# Patient Record
Sex: FEMALE | Race: WHITE | NOT HISPANIC OR LATINO | Employment: OTHER | URBAN - METROPOLITAN AREA
[De-identification: names, ages, dates, MRNs, and addresses within clinical notes are randomized per-mention and may not be internally consistent; named-entity substitution may affect disease eponyms.]

---

## 2017-05-03 ENCOUNTER — TELEPHONE (OUTPATIENT)
Dept: URGENT CARE | Facility: CLINIC | Age: 57
End: 2017-05-03

## 2017-05-03 ENCOUNTER — HOSPITAL ENCOUNTER (OUTPATIENT)
Dept: RADIOLOGY | Facility: MEDICAL CENTER | Age: 57
End: 2017-05-03
Attending: PHYSICIAN ASSISTANT
Payer: COMMERCIAL

## 2017-05-03 ENCOUNTER — OFFICE VISIT (OUTPATIENT)
Dept: URGENT CARE | Facility: CLINIC | Age: 57
End: 2017-05-03
Payer: COMMERCIAL

## 2017-05-03 VITALS
DIASTOLIC BLOOD PRESSURE: 78 MMHG | TEMPERATURE: 99.3 F | HEART RATE: 103 BPM | OXYGEN SATURATION: 95 % | HEIGHT: 66 IN | RESPIRATION RATE: 14 BRPM | BODY MASS INDEX: 47.09 KG/M2 | SYSTOLIC BLOOD PRESSURE: 132 MMHG | WEIGHT: 293 LBS

## 2017-05-03 DIAGNOSIS — R22.9 SOFT TISSUE SWELLING: ICD-10-CM

## 2017-05-03 DIAGNOSIS — R22.42 MASS OF LEFT ANKLE: ICD-10-CM

## 2017-05-03 PROCEDURE — 93971 EXTREMITY STUDY: CPT | Mod: LT

## 2017-05-03 PROCEDURE — 99214 OFFICE O/P EST MOD 30 MIN: CPT | Performed by: PHYSICIAN ASSISTANT

## 2017-05-03 PROCEDURE — 76882 US LMTD JT/FCL EVL NVASC XTR: CPT | Mod: LT

## 2017-05-03 RX ORDER — ASPIRIN 81 MG/1
81 TABLET, CHEWABLE ORAL DAILY
COMMUNITY
End: 2023-02-02 | Stop reason: SDUPTHER

## 2017-05-03 RX ORDER — LISINOPRIL 40 MG/1
TABLET ORAL
Refills: 11 | COMMUNITY
Start: 2017-04-25 | End: 2024-02-29

## 2017-05-03 RX ORDER — METOPROLOL SUCCINATE 25 MG/1
TABLET, EXTENDED RELEASE ORAL
Refills: 11 | COMMUNITY
Start: 2017-04-25 | End: 2022-02-22

## 2017-05-03 RX ORDER — BUPROPION HYDROCHLORIDE 150 MG/1
TABLET, EXTENDED RELEASE ORAL
Refills: 5 | COMMUNITY
Start: 2017-04-25 | End: 2022-02-22

## 2017-05-03 ASSESSMENT — ENCOUNTER SYMPTOMS
PSYCHIATRIC NEGATIVE: 1
MYALGIAS: 1
GASTROINTESTINAL NEGATIVE: 1
CONSTITUTIONAL NEGATIVE: 1
FALLS: 0
CARDIOVASCULAR NEGATIVE: 1
EYES NEGATIVE: 1
SENSORY CHANGE: 0
RESPIRATORY NEGATIVE: 1

## 2017-05-03 NOTE — PATIENT INSTRUCTIONS
I recommend compression stockings and leg elevation.  Get US done and I will call you with results.    Ganglion Cyst  A ganglion cyst is a noncancerous, fluid-filled lump that occurs near joints or tendons. The ganglion cyst grows out of a joint or the lining of a tendon. It most often develops in the hand or wrist, but it can also develop in the shoulder, elbow, hip, knee, ankle, or foot. The round or oval ganglion cyst can be the size of a pea or larger than a grape. Increased activity may enlarge the size of the cyst because more fluid starts to build up.   CAUSES  It is not known what causes a ganglion cyst to grow. However, it may be related to:  · Inflammation or irritation around the joint.  · An injury.  · Repetitive movements or overuse.  · Arthritis.  RISK FACTORS  Risk factors include:  · Being a woman.  · Being age 20-50.  SIGNS AND SYMPTOMS  Symptoms may include:   · A lump. This most often appears on the hand or wrist, but it can occur in other areas of the body.  · Tingling.  · Pain.  · Numbness.  · Muscle weakness.  · Weak .  · Less movement in a joint.  DIAGNOSIS  Ganglion cysts are most often diagnosed based on a physical exam. Your health care provider will feel the lump and may shine a light alongside it. If it is a ganglion cyst, a light often shines through it. Your health care provider may order an X-ray, ultrasound, or MRI to rule out other conditions.  TREATMENT  Ganglion cysts usually go away on their own without treatment. If pain or other symptoms are involved, treatment may be needed. Treatment is also needed if the ganglion cyst limits your movement or if it gets infected. Treatment may include:  · Wearing a brace or splint on your wrist or finger.  · Taking anti-inflammatory medicine.  · Draining fluid from the lump with a needle (aspiration).  · Injecting a steroid into the joint.  · Surgery to remove the ganglion cyst.  HOME CARE INSTRUCTIONS  · Do not press on the ganglion cyst,  poke it with a needle, or hit it.  · Take medicines only as directed by your health care provider.  · Wear your brace or splint as directed by your health care provider.  · Watch your ganglion cyst for any changes.  · Keep all follow-up visits as directed by your health care provider. This is important.  SEEK MEDICAL CARE IF:  · Your ganglion cyst becomes larger or more painful.  · You have increased redness, red streaks, or swelling.  · You have pus coming from the lump.  · You have weakness or numbness in the affected area.  · You have a fever or chills.     This information is not intended to replace advice given to you by your health care provider. Make sure you discuss any questions you have with your health care provider.     Document Released: 12/15/2001 Document Revised: 01/08/2016 Document Reviewed: 06/02/2015  Elsevier Interactive Patient Education ©2016 [employer] Inc.

## 2017-05-03 NOTE — PROGRESS NOTES
Subjective:      Domitila Hua is a 57 y.o. female who presents with Ankle Swelling            HPI  Chief Complaint   Patient presents with   • Ankle Swelling     (L) ankle since last night pt noticed a golf size ball in (L) ankle and thsi morning was red       HPI:  Domitila Hua is a 57 y.o. female who presents with left ankle swelling since last night.  She describes it as a golf ball.  Now red skin changes since this am.  No history of gout. He has not been traveling or immobile for the last several days. No central lump in the shower yesterday. No pain but did notice redness this morning. Thought she had a spider bite. She does normally have lower extremity swelling. No history of pulmonary Embolism or DVTs. She is not on estrogen replacement placement therapy. Non-smoker.  No long involvement at this time. Patient denies HA, SOB, chest pain, palpitations, fever, chills, or n/v/d.    Takes  daily.    No past medical history on file.    Past Surgical History   Procedure Laterality Date   • Gastroscopy with biopsy  4/16/2010     Performed by LATHA PERALTA at ENDOSCOPY [facility]       No family history on file.    Social History     Social History   • Marital Status: Single     Spouse Name: N/A   • Number of Children: N/A   • Years of Education: N/A     Occupational History   • Not on file.     Social History Main Topics   • Smoking status: Never Smoker    • Smokeless tobacco: Never Used   • Alcohol Use: No   • Drug Use: No   • Sexual Activity: Not on file     Other Topics Concern   • Not on file     Social History Narrative         Current outpatient prescriptions:   •  aspirin, 81 mg, Oral, DAILY  •  buPROPion SR, TK 1 T PO  BID  •  lisinopril, TK 1 T PO QD  •  metoprolol SR, TK 1 T PO  QD  •  erythromycin, Apply 1/2 cm ribbon to upper inner eyelid QID x 5 days  •  oxycodone-acetaminophen, 1-2 Tab, Oral, Q4HRS PRN  •  buPROPion, 75 mg, Oral, BID  •  metoprolol, 25 mg, Oral, DAILY  •   "aspirin, 325 mg, Oral, DAILY  •  lisinopril, Take  by mouth every day.  •  BUPROPION HCL, 150 mg, Oral, BID    Allergies   Allergen Reactions   • Nkda [No Known Drug Allergy]           Review of Systems   Constitutional: Negative.    HENT: Negative.    Eyes: Negative.    Respiratory: Negative.    Cardiovascular: Negative.  Negative for leg swelling.   Gastrointestinal: Negative.    Genitourinary: Negative.    Musculoskeletal: Positive for myalgias and joint pain. Negative for falls.   Skin:        Redness to ankle   Neurological: Negative for sensory change.   Psychiatric/Behavioral: Negative.           Objective:     /78 mmHg  Pulse 103  Temp(Src) 37.4 °C (99.3 °F)  Resp 14  Ht 1.676 m (5' 6\")  Wt 156.945 kg (346 lb)  BMI 55.87 kg/m2  SpO2 95%     Physical Exam   Musculoskeletal:        Feet:           Nursing note and vitals reviewed.    Constitutional:  Appropriately groomed, pleasant affect, well nourished, and in no acute distress.    HEENT:  Head: Atraumatic, normocephalic.    Ears:  Hearing grossly intact to voice.    Eyes:  Conjunctivae clear, sclera white, and medial canthus without exudate bilaterally.    Lungs:  Lungs with normal respiratory excursion and effort.      Left Ankle:  Moderate edema bilaterally, left medial ankle greater than right.  No erythema, or ecchymosis present.  Palpable firm ballotable cystlike structure present over the medial malleolus with no tenderness to palpation. No Ttp across the tarsals.  No ttp over ATFL, deltoid ligaments, med/lat malleoulus, achilles tendon, or metatarsals.      FROM: Dorsiflexion, plantar flexion, inversion, eversion.      Motor Examination: Dorsi/plantar flexion 5/5 without atrophy.  Negative Chester's.      Negative Anterior/Posterior drawer.     Sensation equal bilaterally to light touch for L4, L5, and S1.      DTR 2+ for Achilles and patella bilaterally.  NVS intact, DP 2+ bilaterally.  Gait and station wnl, non antalgic.    Derm:  No " rashes or lesions with good turgor pressure.     Psychiatric:  Normal judgement, mood and affect.    Assessment/Plan:     1. Soft tissue swelling  Patient presents with soft tissue swelling over the left medial malleolus since yesterday. No pain associated. Patient has no history of DVTs or pulmonary embolisms. She is not on estrogen replacement therapy. She states that she was once tested for DVT with ultrasound of the left leg years passage was negative. On exam patient does have a golf ball sized firm nodule/cystlike structure just superior to left medial malleolus but no superficial skin changes. Some vascularity noted. No pain with ankle range of motion. No pain along the greater saphenous vein. Plan order ultrasound to rule out DVT or early thrombosis. Again patient has a low well's criteria. Diagnosis includes ganglion on cyst. Epidermoid cyst, and thrombosis. May consider referral to orthopedics if ultrasound is definitive for ganglion cyst. At this time recommended compression and elevation. ER precautions given.    Patient was in agreement with this treatment plan and seemed to understand without barriers. All questions were encouraged and answered.  Reviewed signs and symptoms of when to seek emergency medical care.     Please note that this dictation was created using voice recognition software.  I have made every reasonable attempt to correct obvious errors, but I expect there are errors of jared and possibly content that I did not discover before finalizing the note.     - US-EXTREMITY VENOUS UNILATERAL-LOWER LEFT; Future

## 2017-05-03 NOTE — MR AVS SNAPSHOT
"        Domitila Hua   5/3/2017 12:00 PM   Office Visit   MRN: [id number]    Department:  [facility]   Dept Phone:  [phone]    Description:  Female : 1960   Provider:  Sin Toro PA-C           Reason for Visit     Ankle Swelling (L) ankle since last night pt noticed a golf size ball in (L) ankle and thsi morning was red      Allergies as of 5/3/2017     Allergen Noted Reactions    Nkda [No Known Drug Allergy] 2010         You were diagnosed with     Soft tissue swelling   [[id number]]         Vital Signs     Blood Pressure Pulse Temperature Respirations Height Weight    132/78 mmHg 103 37.4 °C (99.3 °F) 14 1.676 m (5' 6\") 156.945 kg (346 lb)    Body Mass Index Oxygen Saturation Smoking Status             55.87 kg/m2 95% Never Smoker          Basic Information     Date Of Birth Sex Race Ethnicity Preferred Language    1960 Female White Non- English      Your appointments     May 03, 2017  5:30 PM   US EXTREMITY (30) A with VISTA US 1   [facility] ([place]    [address] [facility] [address]   [phone]            May 03, 2017  6:00 PM   US EXTREMITY (30) with VISTA US 1   IMAGING VISTA ([place])    [address] [place] [address]   [phone]              Health Maintenance        Date Due Completion Dates    IMM DTaP/Tdap/Td Vaccine (1 - Tdap) 1981 ---    PAP SMEAR 1983 ---    MAMMOGRAM 2002 ---    COLONOSCOPY 2012 ---            Current Immunizations     No immunizations on file.      Below and/or attached are the medications your provider expects you to take. Review all of your home medications and newly ordered medications with your provider and/or pharmacist. Follow medication instructions as directed by your provider and/or pharmacist. Please keep your medication list with you and share with your provider. Update the information when medications are discontinued, doses are changed, or new medications (including over-the-counter " products) are added; and carry medication information at all times in the event of emergency situations     Allergies:  NKDA - (reactions not documented)               Medications  Valid as of: May 03, 2017 - 12:59 PM    Generic Name Brand Name Tablet Size Instructions for use    Aspirin (Tab)  MG Take 325 mg by mouth every day.        Aspirin (Chew Tab) ASA 81 MG Take 81 mg by mouth every day.        BuPROPion HCl   Take 150 mg by mouth 2 Times a Day.        BuPROPion HCl (Tab) WELLBUTRIN 75 MG Take 75 mg by mouth 2 times a day.        BuPROPion HCl (TABLET SR 12 HR) WELLBUTRIN- MG TK 1 T PO  BID        Erythromycin (Ointment) erythromycin 5 MG/GM Apply 1/2 cm ribbon to upper inner eyelid QID x 5 days        Lisinopril (Tab) PRINIVIL 20 MG Take  by mouth every day.        Lisinopril (Tab) PRINIVIL, ZESTRIL 40 MG TK 1 T PO QD        Metoprolol Succinate (TABLET SR 24 HR) TOPROL XL 25 MG TK 1 T PO  QD        Metoprolol Tartrate (Tab) LOPRESSOR 25 MG Take 25 mg by mouth every day.        Oxycodone-Acetaminophen (Tab) PERCOCET 5-325 MG Take 1-2 Tabs by mouth every four hours as needed.        .                 Medicines prescribed today were sent to:     [facility] [address] [place] [address] AT [facility] & [address] [address]    Phone: [phone] Fax: [phone]    Open 24 Hours?: No      Medication refill instructions:       If your prescription bottle indicates you have medication refills left, it is not necessary to call your provider’s office. Please contact your pharmacy and they will refill your medication.    If your prescription bottle indicates you do not have any refills left, you may request refills at any time through one of the following ways: The online [employer] system (except Urgent Care), by calling your provider’s office, or by asking your pharmacy to contact your provider’s office with a refill request. Medication refills are  processed only during regular business hours and may not be available until the next business day. Your provider may request additional information or to have a follow-up visit with you prior to refilling your medication.   *Please Note: Medication refills are assigned a new Rx number when refilled electronically. Your pharmacy may indicate that no refills were authorized even though a new prescription for the same medication is available at the pharmacy. Please request the medicine by name with the pharmacy before contacting your provider for a refill.        Your To Do List     Future Labs/Procedures Complete By Expires    US-EXTREMITY VENOUS UNILATERAL-LOWER LEFT  As directed 5/3/2018      Instructions    I recommend compression stockings and leg elevation.  Get US done and I will call you with results.    Ganglion Cyst  A ganglion cyst is a noncancerous, fluid-filled lump that occurs near joints or tendons. The ganglion cyst grows out of a joint or the lining of a tendon. It most often develops in the hand or wrist, but it can also develop in the shoulder, elbow, hip, knee, ankle, or foot. The round or oval ganglion cyst can be the size of a pea or larger than a grape. Increased activity may enlarge the size of the cyst because more fluid starts to build up.   CAUSES  It is not known what causes a ganglion cyst to grow. However, it may be related to:  · Inflammation or irritation around the joint.  · An injury.  · Repetitive movements or overuse.  · Arthritis.  RISK FACTORS  Risk factors include:  · Being a woman.  · Being age 20-50.  SIGNS AND SYMPTOMS  Symptoms may include:   · A lump. This most often appears on the hand or wrist, but it can occur in other areas of the body.  · Tingling.  · Pain.  · Numbness.  · Muscle weakness.  · Weak .  · Less movement in a joint.  DIAGNOSIS  Ganglion cysts are most often diagnosed based on a physical exam. Your health care provider will feel the lump and may shine a  light alongside it. If it is a ganglion cyst, a light often shines through it. Your health care provider may order an X-ray, ultrasound, or MRI to rule out other conditions.  TREATMENT  Ganglion cysts usually go away on their own without treatment. If pain or other symptoms are involved, treatment may be needed. Treatment is also needed if the ganglion cyst limits your movement or if it gets infected. Treatment may include:  · Wearing a brace or splint on your wrist or finger.  · Taking anti-inflammatory medicine.  · Draining fluid from the lump with a needle (aspiration).  · Injecting a steroid into the joint.  · Surgery to remove the ganglion cyst.  HOME CARE INSTRUCTIONS  · Do not press on the ganglion cyst, poke it with a needle, or hit it.  · Take medicines only as directed by your health care provider.  · Wear your brace or splint as directed by your health care provider.  · Watch your ganglion cyst for any changes.  · Keep all follow-up visits as directed by your health care provider. This is important.  SEEK MEDICAL CARE IF:  · Your ganglion cyst becomes larger or more painful.  · You have increased redness, red streaks, or swelling.  · You have pus coming from the lump.  · You have weakness or numbness in the affected area.  · You have a fever or chills.     This information is not intended to replace advice given to you by your health care provider. Make sure you discuss any questions you have with your health care provider.     Document Released: 12/15/2001 Document Revised: 01/08/2016 Document Reviewed: 06/02/2015  [employer] Interactive Patient Education ©2016 [employer]            [employer] Access Code: [email or web address]  Expires: 6/2/2017 12:59 PM    [employer]  A secure, online tool to manage your health information     [employer]® is a secure, online tool that connects you to your personalized health information from the privacy of your home -- day or night - making it very easy for you to  manage your healthcare. Once the activation process is completed, you can even access your medical information using the "[employer] marcial, which is available for free in the [employer] Marcial store or Google Play store.     "[employer] provides the following levels of access (as shown below):   My Chart Features   [facility] Doctor [facility]  Specialists [facility]  Urgent  Care Non-[facility]  Primary Care  Doctor   Email your healthcare team securely and privately 24/7 X X X    Manage appointments: schedule your next appointment; view details of past/upcoming appointments X      Request prescription refills. X      View recent personal medical records, including lab and immunizations X X X X   View health record, including health history, allergies, medications X X X X   Read reports about your outpatient visits, procedures, consult and ER notes X X X X   See your discharge summary, which is a recap of your hospital and/or ER visit that includes your diagnosis, lab results, and care plan. X X       How to register for "[employer]:  1. Go to  [email or web address].  2. Click on the Sign Up Now box, which takes you to the New Member Sign Up page. You will need to provide the following information:  a. Enter your "[employer] Access Code exactly as it appears at the top of this page. (You will not need to use this code after you’ve completed the sign-up process. If you do not sign up before the expiration date, you must request a new code.)   b. Enter your date of birth.   c. Enter your home email address.   d. Click Submit, and follow the next screen’s instructions.  3. Create a "[employer] ID. This will be your "[employer] login ID and cannot be changed, so think of one that is secure and easy to remember.  4. Create a "[employer] password. You can change your password at any time.  5. Enter your Password Reset Question and Answer. This can be used at a later time if you forget your password.   6. Enter your e-mail address. This allows you to  receive e-mail notifications when new information is available in [employer].  7. Click Sign Up. You can now view your health information.    For assistance activating your [employer] account, call [phone]

## 2017-05-04 NOTE — TELEPHONE ENCOUNTER
Informed patient of ultrasound results. Informed her that there is no evidence of DVT and likely not a ganglion cyst. Would require MRI for further evaluation. Most likely fat lipoma but no sign of infection. Reviewed signs and symptoms of infection when to return to clinic. Recommended that she follow with her PCP for further evaluation. At this time elevate and compression and monitor. Patient was in agreement with this treatment plan. She was appreciative of the phone call. All questions were encouraged and answered.

## 2017-07-11 ENCOUNTER — HOSPITAL ENCOUNTER (EMERGENCY)
Facility: MEDICAL CENTER | Age: 57
End: 2017-07-11
Attending: EMERGENCY MEDICINE
Payer: COMMERCIAL

## 2017-07-11 ENCOUNTER — APPOINTMENT (OUTPATIENT)
Dept: RADIOLOGY | Facility: MEDICAL CENTER | Age: 57
End: 2017-07-11
Attending: EMERGENCY MEDICINE
Payer: COMMERCIAL

## 2017-07-11 VITALS
TEMPERATURE: 97.1 F | HEIGHT: 66 IN | HEART RATE: 89 BPM | DIASTOLIC BLOOD PRESSURE: 92 MMHG | WEIGHT: 293 LBS | SYSTOLIC BLOOD PRESSURE: 191 MMHG | RESPIRATION RATE: 20 BRPM | BODY MASS INDEX: 47.09 KG/M2 | OXYGEN SATURATION: 94 %

## 2017-07-11 DIAGNOSIS — N20.1 URETEROLITHIASIS: ICD-10-CM

## 2017-07-11 DIAGNOSIS — R10.9 FLANK PAIN: ICD-10-CM

## 2017-07-11 LAB
ALBUMIN SERPL BCP-MCNC: 3.8 G/DL (ref 3.2–4.9)
ALBUMIN/GLOB SERPL: 1 G/DL
ALP SERPL-CCNC: 80 U/L (ref 30–99)
ALT SERPL-CCNC: 9 U/L (ref 2–50)
ANION GAP SERPL CALC-SCNC: 11 MMOL/L (ref 0–11.9)
ANISOCYTOSIS BLD QL SMEAR: ABNORMAL
APPEARANCE UR: ABNORMAL
AST SERPL-CCNC: 12 U/L (ref 12–45)
BASOPHILS # BLD AUTO: 0.5 % (ref 0–1.8)
BASOPHILS # BLD: 0.04 K/UL (ref 0–0.12)
BILIRUB SERPL-MCNC: 0.4 MG/DL (ref 0.1–1.5)
BUN SERPL-MCNC: 17 MG/DL (ref 8–22)
CALCIUM SERPL-MCNC: 9.1 MG/DL (ref 8.5–10.5)
CHLORIDE SERPL-SCNC: 105 MMOL/L (ref 96–112)
CO2 SERPL-SCNC: 22 MMOL/L (ref 20–33)
COLOR UR AUTO: ABNORMAL
COMMENT 1642: NORMAL
CREAT SERPL-MCNC: 1.09 MG/DL (ref 0.5–1.4)
EOSINOPHIL # BLD AUTO: 0.19 K/UL (ref 0–0.51)
EOSINOPHIL NFR BLD: 2.6 % (ref 0–6.9)
ERYTHROCYTE [DISTWIDTH] IN BLOOD BY AUTOMATED COUNT: 48.9 FL (ref 35.9–50)
GFR SERPL CREATININE-BSD FRML MDRD: 52 ML/MIN/1.73 M 2
GLOBULIN SER CALC-MCNC: 3.7 G/DL (ref 1.9–3.5)
GLUCOSE SERPL-MCNC: 152 MG/DL (ref 65–99)
GLUCOSE UR QL STRIP.AUTO: NEGATIVE MG/DL
HCT VFR BLD AUTO: 38.3 % (ref 37–47)
HGB BLD-MCNC: 11.3 G/DL (ref 12–16)
HYPOCHROMIA BLD QL SMEAR: ABNORMAL
IMM GRANULOCYTES # BLD AUTO: 0.03 K/UL (ref 0–0.11)
IMM GRANULOCYTES NFR BLD AUTO: 0.4 % (ref 0–0.9)
KETONES UR QL STRIP.AUTO: NEGATIVE MG/DL
LEUKOCYTE ESTERASE UR QL STRIP.AUTO: NEGATIVE
LIPASE SERPL-CCNC: 21 U/L (ref 11–82)
LYMPHOCYTES # BLD AUTO: 1 K/UL (ref 1–4.8)
LYMPHOCYTES NFR BLD: 13.7 % (ref 22–41)
MCH RBC QN AUTO: 23.2 PG (ref 27–33)
MCHC RBC AUTO-ENTMCNC: 29.5 G/DL (ref 33.6–35)
MCV RBC AUTO: 78.5 FL (ref 81.4–97.8)
MICROCYTES BLD QL SMEAR: ABNORMAL
MONOCYTES # BLD AUTO: 0.46 K/UL (ref 0–0.85)
MONOCYTES NFR BLD AUTO: 6.3 % (ref 0–13.4)
MORPHOLOGY BLD-IMP: NORMAL
NEUTROPHILS # BLD AUTO: 5.58 K/UL (ref 2–7.15)
NEUTROPHILS NFR BLD: 76.5 % (ref 44–72)
NITRITE UR QL STRIP.AUTO: NEGATIVE
NRBC # BLD AUTO: 0 K/UL
NRBC BLD AUTO-RTO: 0 /100 WBC
OVALOCYTES BLD QL SMEAR: NORMAL
PH UR STRIP.AUTO: 6 [PH]
PLATELET # BLD AUTO: 232 K/UL (ref 164–446)
PLATELET BLD QL SMEAR: NORMAL
PMV BLD AUTO: 9.7 FL (ref 9–12.9)
POIKILOCYTOSIS BLD QL SMEAR: NORMAL
POTASSIUM SERPL-SCNC: 4.2 MMOL/L (ref 3.6–5.5)
PROT SERPL-MCNC: 7.5 G/DL (ref 6–8.2)
PROT UR QL STRIP: 100 MG/DL
RBC # BLD AUTO: 4.88 M/UL (ref 4.2–5.4)
RBC BLD AUTO: PRESENT
RBC UR QL AUTO: ABNORMAL
SODIUM SERPL-SCNC: 138 MMOL/L (ref 135–145)
SP GR UR: >=1.03
WBC # BLD AUTO: 7.3 K/UL (ref 4.8–10.8)

## 2017-07-11 PROCEDURE — 96374 THER/PROPH/DIAG INJ IV PUSH: CPT

## 2017-07-11 PROCEDURE — 700111 HCHG RX REV CODE 636 W/ 250 OVERRIDE (IP): Performed by: EMERGENCY MEDICINE

## 2017-07-11 PROCEDURE — 83690 ASSAY OF LIPASE: CPT

## 2017-07-11 PROCEDURE — 36415 COLL VENOUS BLD VENIPUNCTURE: CPT

## 2017-07-11 PROCEDURE — 302128 INFUSION PUMP: Performed by: EMERGENCY MEDICINE

## 2017-07-11 PROCEDURE — 99284 EMERGENCY DEPT VISIT MOD MDM: CPT

## 2017-07-11 PROCEDURE — 96375 TX/PRO/DX INJ NEW DRUG ADDON: CPT

## 2017-07-11 PROCEDURE — 74176 CT ABD & PELVIS W/O CONTRAST: CPT

## 2017-07-11 PROCEDURE — 80053 COMPREHEN METABOLIC PANEL: CPT

## 2017-07-11 PROCEDURE — 81002 URINALYSIS NONAUTO W/O SCOPE: CPT

## 2017-07-11 PROCEDURE — 85025 COMPLETE CBC W/AUTO DIFF WBC: CPT

## 2017-07-11 PROCEDURE — 700105 HCHG RX REV CODE 258: Performed by: EMERGENCY MEDICINE

## 2017-07-11 RX ORDER — ONDANSETRON 2 MG/ML
4 INJECTION INTRAMUSCULAR; INTRAVENOUS ONCE
Status: COMPLETED | OUTPATIENT
Start: 2017-07-11 | End: 2017-07-11

## 2017-07-11 RX ORDER — SODIUM CHLORIDE 9 MG/ML
INJECTION, SOLUTION INTRAVENOUS CONTINUOUS
Status: DISCONTINUED | OUTPATIENT
Start: 2017-07-11 | End: 2017-07-11 | Stop reason: HOSPADM

## 2017-07-11 RX ORDER — OXYCODONE HYDROCHLORIDE AND ACETAMINOPHEN 5; 325 MG/1; MG/1
1-2 TABLET ORAL EVERY 4 HOURS PRN
Qty: 15 TAB | Refills: 0 | Status: SHIPPED | OUTPATIENT
Start: 2017-07-11 | End: 2023-02-02

## 2017-07-11 RX ORDER — ONDANSETRON 4 MG/1
4 TABLET, ORALLY DISINTEGRATING ORAL EVERY 8 HOURS PRN
Qty: 10 TAB | Refills: 0 | Status: SHIPPED | OUTPATIENT
Start: 2017-07-11 | End: 2023-02-02

## 2017-07-11 RX ORDER — KETOROLAC TROMETHAMINE 30 MG/ML
30 INJECTION, SOLUTION INTRAMUSCULAR; INTRAVENOUS ONCE
Status: COMPLETED | OUTPATIENT
Start: 2017-07-11 | End: 2017-07-11

## 2017-07-11 RX ADMIN — KETOROLAC TROMETHAMINE 30 MG: 30 INJECTION INTRAMUSCULAR; INTRAVENOUS at 09:20

## 2017-07-11 RX ADMIN — SODIUM CHLORIDE: 9 INJECTION, SOLUTION INTRAVENOUS at 09:20

## 2017-07-11 RX ADMIN — ONDANSETRON 4 MG: 2 INJECTION INTRAMUSCULAR; INTRAVENOUS at 09:20

## 2017-07-11 ASSESSMENT — PAIN SCALES - GENERAL
PAINLEVEL_OUTOF10: 5
PAINLEVEL_OUTOF10: 10

## 2017-07-11 ASSESSMENT — LIFESTYLE VARIABLES: DO YOU DRINK ALCOHOL: NO

## 2017-07-11 NOTE — ED NOTES
"Chief Complaint   Patient presents with   • Flank Pain     Pt BIB self to triage for c/o left flank pain, pt reports symptoms to have started around 0730, Pt reports hx of kideny stones, pt denies vomiting/diarrhea, reports of nausea/ feeling \"clammy\" when pain present. pt denies dysuria.     Explained to pt triage process, made pt aware to tell this RN of any changes/concerns, pt verbalized understanding of process and instructions given. Pt to ER lobby.    "

## 2017-07-11 NOTE — ED AVS SNAPSHOT
Home Care Instructions                                                                                                                Domitila Hua   MRN: [id number]    Department:  [facility], Emergency Dept   Date of Visit:  7/11/2017            [facility], Emergency Dept    [address] [place] [id number]    Phone:  [phone]      You were seen by     Ellis Woods M.D.      Your Diagnosis Was     Flank pain     R10.9       These are the medications you received during your hospitalization from 07/11/2017 0823 to 07/11/2017 1113     Date/Time Order Dose Route Action    07/11/2017 0920 NS infusion   Intravenous New Bag    07/11/2017 0920 ketorolac (TORADOL) injection 30 mg 30 mg Intravenous Given    07/11/2017 0920 ondansetron (ZOFRAN) syringe/vial injection 4 mg 4 mg Intravenous Given      Follow-up Information     1. Follow up with Carlos Wiggins M.D..    Specialty:  Family Medicine    Contact information    [address]  [address]  [facility] [address]  [phone]          2. Follow up with [facility], Emergency Dept.    Specialty:  Emergency Medicine    Why:  If symptoms worsen    Contact information    [address] [place] [id number] [phone]      Medication Information     Review all of your home medications and newly ordered medications with your primary doctor and/or pharmacist as soon as possible. Follow medication instructions as directed by your doctor and/or pharmacist.     Please keep your complete medication list with you and share with your physician. Update the information when medications are discontinued, doses are changed, or new medications (including over-the-counter products) are added; and carry medication information at all times in the event of emergency situations.               Medication List      START taking these medications        Instructions    Morning Afternoon Evening Bedtime    ondansetron 4  MG Tbdp   Commonly known as:  ZOFRAN ODT        Take 1 Tab by mouth every 8 hours as needed.   Dose:  4 mg                        oxycodone-acetaminophen 5-325 MG Tabs   Commonly known as:  PERCOCET        Take 1-2 Tabs by mouth every four hours as needed (pain).   Dose:  1-2 Tab                          ASK your doctor about these medications        Instructions    Morning Afternoon Evening Bedtime    aspirin 81 MG Chew chewable tablet   Commonly known as:  ASA        Take 81 mg by mouth every day.   Dose:  81 mg                        * lisinopril 20 MG Tabs   Commonly known as:  PRINIVIL        Take  by mouth every day.                        * lisinopril 40 MG tablet   Commonly known as:  PRINIVIL, ZESTRIL        TK 1 T PO QD                        metoprolol 25 MG Tabs   Commonly known as:  LOPRESSOR        Take 25 mg by mouth every day.   Dose:  25 mg                        metoprolol SR 25 MG Tb24   Commonly known as:  TOPROL XL        TK 1 T PO  QD                        * buPROPion  MG Tb12 sustained-release tablet   Commonly known as:  WELLBUTRIN-SR        TK 1 T PO  BID                        * WELLBUTRIN 75 MG Tabs   Generic drug:  buPROPion        Take 75 mg by mouth 2 times a day.   Dose:  75 mg                        * Notice:  This list has 4 medication(s) that are the same as other medications prescribed for you. Read the directions carefully, and ask your doctor or other care provider to review them with you.         Where to Get Your Medications      You can get these medications from any pharmacy     Bring a paper prescription for each of these medications    - ondansetron 4 MG Tbdp  - oxycodone-acetaminophen 5-325 MG Tabs            Procedures and tests performed during your visit     CBC WITH DIFFERENTIAL    COMP METABOLIC PANEL    CT-RENAL COLIC EVALUATION(A/P W/O)    DIFFERENTIAL COMMENT    ESTIMATED GFR    INFUSION PUMP    LIPASE    MORPHOLOGY    PERIPHERAL SMEAR REVIEW    PLATELET  ESTIMATE    POC UA        Discharge Instructions       Kidney Stones  Kidney stones (urolithiasis) are deposits that form inside your kidneys. The intense pain is caused by the stone moving through the urinary tract. When the stone moves, the ureter goes into spasm around the stone. The stone is usually passed in the urine.   CAUSES   · A disorder that makes certain neck glands produce too much parathyroid hormone (primary hyperparathyroidism).  · A buildup of uric acid crystals, similar to gout in your joints.  · Narrowing (stricture) of the ureter.  · A kidney obstruction present at birth (congenital obstruction).  · Previous surgery on the kidney or ureters.  · Numerous kidney infections.  SYMPTOMS   · Feeling sick to your stomach (nauseous).  · Throwing up (vomiting).  · Blood in the urine (hematuria).  · Pain that usually spreads (radiates) to the groin.  · Frequency or urgency of urination.  DIAGNOSIS   · Taking a history and physical exam.  · Blood or urine tests.  · CT scan.  · Occasionally, an examination of the inside of the urinary bladder (cystoscopy) is performed.  TREATMENT   · Observation.  · Increasing your fluid intake.  · Extracorporeal shock wave lithotripsy--This is a noninvasive procedure that uses shock waves to break up kidney stones.  · Surgery may be needed if you have severe pain or persistent obstruction. There are various surgical procedures. Most of the procedures are performed with the use of small instruments. Only small incisions are needed to accommodate these instruments, so recovery time is minimized.  The size, location, and chemical composition are all important variables that will determine the proper choice of action for you. Talk to your health care provider to better understand your situation so that you will minimize the risk of injury to yourself and your kidney.   HOME CARE INSTRUCTIONS   · Drink enough water and fluids to keep your urine clear or pale yellow. This will  help you to pass the stone or stone fragments.  · Strain all urine through the provided strainer. Keep all particulate matter and stones for your health care provider to see. The stone causing the pain may be as small as a grain of salt. It is very important to use the strainer each and every time you pass your urine. The collection of your stone will allow your health care provider to analyze it and verify that a stone has actually passed. The stone analysis will often identify what you can do to reduce the incidence of recurrences.  · Only take over-the-counter or prescription medicines for pain, discomfort, or fever as directed by your health care provider.  · Make a follow-up appointment with your health care provider as directed.  · Get follow-up X-rays if required. The absence of pain does not always mean that the stone has passed. It may have only stopped moving. If the urine remains completely obstructed, it can cause loss of kidney function or even complete destruction of the kidney. It is your responsibility to make sure X-rays and follow-ups are completed. Ultrasounds of the kidney can show blockages and the status of the kidney. Ultrasounds are not associated with any radiation and can be performed easily in a matter of minutes.  SEEK MEDICAL CARE IF:  · You experience pain that is progressive and unresponsive to any pain medicine you have been prescribed.  SEEK IMMEDIATE MEDICAL CARE IF:   · Pain cannot be controlled with the prescribed medicine.  · You have a fever or shaking chills.  · The severity or intensity of pain increases over 18 hours and is not relieved by pain medicine.  · You develop a new onset of abdominal pain.  · You feel faint or pass out.  · You are unable to urinate.  MAKE SURE YOU:   · Understand these instructions.  · Will watch your condition.  · Will get help right away if you are not doing well or get worse.     This information is not intended to replace advice given to you by  your health care provider. Make sure you discuss any questions you have with your health care provider.     Document Released: 12/18/2006 Document Revised: 01/08/2016 Document Reviewed: 05/21/2014  Elsevier Interactive Patient Education ©2016 Elsevier Inc.            Patient Information     Patient Information    Following emergency treatment: all patient requiring follow-up care must return either to a private physician or a clinic if your condition worsens before you are able to obtain further medical attention, please return to the emergency room.     Billing Information    At [facility], we work to make the billing process streamlined for our patients.  Our Representatives are here to answer any questions you may have regarding your hospital bill.  If you have insurance coverage and have supplied your insurance information to us, we will submit a claim to your insurer on your behalf.  Should you have any questions regarding your bill, we can be reached online or by phone as follows:  Online: You are able pay your bills online or live chat with our representatives about any billing questions you may have. We are here to help Monday - Friday from 8:00am to 7:30pm and 9:00am - 12:00pm on Saturdays.  Please visit [email or web address]  for more information.   Phone:  [phone] or [phone]    Please note that your emergency physician, surgeon, pathologist, radiologist, anesthesiologist, and other specialists are not employed by [facility] and will therefore bill separately for their services.  Please contact them directly for any questions concerning their bills at the numbers below:     Emergency Physician Services:  [phone]  [facility]:  [phone]  [facility]:  [phone]  [facility]:  [phone]    1. Your final bill may vary from the amount quoted upon discharge if all procedures are not complete at that time,  or if your doctor has additional procedures of which we are not aware. You will receive an additional bill if you return to the Emergency Department at [facility] for suture removal regardless of the facility of which the sutures were placed.     2. Please arrange for settlement of this account at the emergency registration.    3. All self-pay accounts are due in full at the time of treatment.  If you are unable to meet this obligation then payment is expected within 4-5 days.     4. If you have had radiology studies (CT, X-ray, Ultrasound, MRI), you have received a preliminary result during your emergency department visit. Please contact the radiology department [phone] to receive a copy of your final result. Please discuss the Final result with your primary physician or with the follow up physician provided.     Crisis Hotline:  [place] Crisis Hotline:  4-596-BACSWST or 1-901.788.3129  [place] Crisis Hotline:    1-695.612.6386 or 197-139-1581         ED Discharge Follow Up Questions    1. In order to provide you with very good care, we would like to follow up with a phone call in the next few days.  May we have your permission to contact you?     YES /  NO    2. What is the best phone number to call you? (       )_____-__________    3. What is the best time to call you?      Morning  /  Afternoon  /  Evening                   Patient Signature:  ____________________________________________________________    Date:  ____________________________________________________________

## 2017-07-11 NOTE — DISCHARGE INSTRUCTIONS
Kidney Stones  Kidney stones (urolithiasis) are deposits that form inside your kidneys. The intense pain is caused by the stone moving through the urinary tract. When the stone moves, the ureter goes into spasm around the stone. The stone is usually passed in the urine.   CAUSES   · A disorder that makes certain neck glands produce too much parathyroid hormone (primary hyperparathyroidism).  · A buildup of uric acid crystals, similar to gout in your joints.  · Narrowing (stricture) of the ureter.  · A kidney obstruction present at birth (congenital obstruction).  · Previous surgery on the kidney or ureters.  · Numerous kidney infections.  SYMPTOMS   · Feeling sick to your stomach (nauseous).  · Throwing up (vomiting).  · Blood in the urine (hematuria).  · Pain that usually spreads (radiates) to the groin.  · Frequency or urgency of urination.  DIAGNOSIS   · Taking a history and physical exam.  · Blood or urine tests.  · CT scan.  · Occasionally, an examination of the inside of the urinary bladder (cystoscopy) is performed.  TREATMENT   · Observation.  · Increasing your fluid intake.  · Extracorporeal shock wave lithotripsy--This is a noninvasive procedure that uses shock waves to break up kidney stones.  · Surgery may be needed if you have severe pain or persistent obstruction. There are various surgical procedures. Most of the procedures are performed with the use of small instruments. Only small incisions are needed to accommodate these instruments, so recovery time is minimized.  The size, location, and chemical composition are all important variables that will determine the proper choice of action for you. Talk to your health care provider to better understand your situation so that you will minimize the risk of injury to yourself and your kidney.   HOME CARE INSTRUCTIONS   · Drink enough water and fluids to keep your urine clear or pale yellow. This will help you to pass the stone or stone fragments.  · Strain  all urine through the provided strainer. Keep all particulate matter and stones for your health care provider to see. The stone causing the pain may be as small as a grain of salt. It is very important to use the strainer each and every time you pass your urine. The collection of your stone will allow your health care provider to analyze it and verify that a stone has actually passed. The stone analysis will often identify what you can do to reduce the incidence of recurrences.  · Only take over-the-counter or prescription medicines for pain, discomfort, or fever as directed by your health care provider.  · Make a follow-up appointment with your health care provider as directed.  · Get follow-up X-rays if required. The absence of pain does not always mean that the stone has passed. It may have only stopped moving. If the urine remains completely obstructed, it can cause loss of kidney function or even complete destruction of the kidney. It is your responsibility to make sure X-rays and follow-ups are completed. Ultrasounds of the kidney can show blockages and the status of the kidney. Ultrasounds are not associated with any radiation and can be performed easily in a matter of minutes.  SEEK MEDICAL CARE IF:  · You experience pain that is progressive and unresponsive to any pain medicine you have been prescribed.  SEEK IMMEDIATE MEDICAL CARE IF:   · Pain cannot be controlled with the prescribed medicine.  · You have a fever or shaking chills.  · The severity or intensity of pain increases over 18 hours and is not relieved by pain medicine.  · You develop a new onset of abdominal pain.  · You feel faint or pass out.  · You are unable to urinate.  MAKE SURE YOU:   · Understand these instructions.  · Will watch your condition.  · Will get help right away if you are not doing well or get worse.     This information is not intended to replace advice given to you by your health care provider. Make sure you discuss any  questions you have with your health care provider.     Document Released: 12/18/2006 Document Revised: 01/08/2016 Document Reviewed: 05/21/2014  [employer] Interactive Patient Education ©2016 Elsevier Inc.

## 2017-07-11 NOTE — ED PROVIDER NOTES
"ED Provider Note    CHIEF COMPLAINT  Chief Complaint   Patient presents with   • Flank Pain     Pt BIB self to triage for c/o left flank pain, pt reports symptoms to have started around 0730, Pt reports hx of kideny stones, pt denies vomiting/diarrhea, reports of nausea/ feeling \"clammy\" when pain present. pt denies dysuria.       HPI  Domitila Hua is a 57 y.o. female who presents for evaluation of left flank pain. The patient states that at approximately 7:30 this morning she had acute onset of left flank pain. She describes it as very severe. She's had nausea but no vomiting. She denies any abdominal pain. She's had no urinary symptoms. Before the onset of her symptoms she states she felt absolutely fine. She does have a history of kidney stones and states that this does feel like her previous kidney stone.    REVIEW OF SYSTEMS  See HPI for further details. All other systems are negative.     PAST MEDICAL HISTORY  History reviewed. No pertinent past medical history.    FAMILY HISTORY  History reviewed. No pertinent family history.    SOCIAL HISTORY  Social History     Social History   • Marital Status: Single     Spouse Name: N/A   • Number of Children: N/A   • Years of Education: N/A     Social History Main Topics   • Smoking status: Never Smoker    • Smokeless tobacco: Never Used   • Alcohol Use: No   • Drug Use: No   • Sexual Activity: Not Asked     Other Topics Concern   • None     Social History Narrative       SURGICAL HISTORY  Past Surgical History   Procedure Laterality Date   • Gastroscopy with biopsy  4/16/2010     Performed by LATHA PERALTA at ENDOSCOPY [facility]       CURRENT MEDICATIONS  Home Medications     Reviewed by Casandra Zamarripa R.N. (Registered Nurse) on 07/11/17 at 0907  Med List Status: Complete    Medication Last Dose Status    aspirin (ASA) 81 MG Chew Tab chewable tablet 7/11/2017 Active    buPROPion (WELLBUTRIN) 75 MG TABS 7/11/2017 Active    buPROPion SR " "(WELLBUTRIN-SR) 150 MG TABLET SR 12 HR sustained-release tablet  Active    lisinopril (PRINIVIL) 20 MG TABS  Active    lisinopril (PRINIVIL, ZESTRIL) 40 MG tablet 7/11/2017 Active    metoprolol (LOPRESSOR) 25 MG TABS  Active    metoprolol SR (TOPROL XL) 25 MG TABLET SR 24 HR 7/11/2017 Active                ALLERGIES  Allergies   Allergen Reactions   • Nkda [No Known Drug Allergy]        PHYSICAL EXAM  VITAL SIGNS: /92 mmHg  Pulse 105  Temp(Src) 36.2 °C (97.1 °F) (Temporal)  Resp 14  Ht 1.676 m (5' 5.98\")  Wt 170 kg (374 lb 12.5 oz)  BMI 60.52 kg/m2  SpO2 96%    Constitutional: Well developed, Well nourished,  Non-toxic appearance.   HENT: Normocephalic, Atraumatic.   Eyes:  EOMI, Conjunctiva normal, No discharge.   Cardiovascular: Tachycardic.   Thorax & Lungs: No respiratory distress.   Abdomen: Soft and nontender.  Skin: Warm, Dry.   Musculoskeletal: Good range of motion in all major joints.  Neurologic: Awake alert.    RADIOLOGY/PROCEDURES  CT-RENAL COLIC EVALUATION(A/P W/O)   Final Result      1.  2 mm distal LEFT ureteral stone with mild obstructive changes.   2.  Heterogeneous fatty infiltration of liver.            COURSE & MEDICAL DECISION MAKING  Pertinent Labs & Imaging studies reviewed. (See chart for details)  This is a 57-year-old here for evaluation of left flank pain. She has a history of kidney stones and states that the pain is similar. She has completely benign abdominal exam. She is here with HER-2 young sons therefore I elected treat her with Toradol and Zofran and no narcotics initially. Laboratories are obtained to include a CBC with a normal white count and differential is 76 polys and 13 lymphocytes. Chemistries are normal with the exception of a glucose of 152 and a nonfasting study. Urine is positive for blood but negative for evidence of infection. CT scan of the abdomen and pelvis shows a 2 mm distal left ureteral stone with mild obstructive changes. On repeat evaluation the " patient is resting comfortably but states that her pain is starting to come back after being well controlled with Toradol. At this point she does not require acute hospitalization or further evaluation the emergency department. Since she has 3 young children with her she would prefer to drive home and just have a prescription for pain medicine. I will provide her a prescription for Percocet as well as Zofran. She will follow up with her primary care provider as needed. I suspect she will be a passed this stone without difficulty. She is given a discharge instruction sheet on kidney stones. She should return here for any uncontrolled pain, vomiting, or if she is just not well.    FINAL IMPRESSION  1. Acute left flank pain  2. Ureterolithiasis   3.         Electronically signed by: Ellis Woods, 7/11/2017 9:16 AM

## 2017-07-11 NOTE — ED AVS SNAPSHOT
ePACT Network Access Code: [email or web address]  Expires: 7/29/2017  4:10 AM    ePACT Network  A secure, online tool to manage your health information     [employer]’s ePACT Network® is a secure, online tool that connects you to your personalized health information from the privacy of your home -- day or night - making it very easy for you to manage your healthcare. Once the activation process is completed, you can even access your medical information using the ePACT Network marcial, which is available for free in the Apple Marcial store or Google Play store.     ePACT Network provides the following levels of access (as shown below):   My Chart Features   [facility] Primary Care Doctor [facility]  Specialists [facility]  Urgent  Care Non-[facility]  Primary Care  Doctor   Email your healthcare team securely and privately 24/7 X X X X   Manage appointments: schedule your next appointment; view details of past/upcoming appointments X      Request prescription refills. X      View recent personal medical records, including lab and immunizations X X X X   View health record, including health history, allergies, medications X X X X   Read reports about your outpatient visits, procedures, consult and ER notes X X X X   See your discharge summary, which is a recap of your hospital and/or ER visit that includes your diagnosis, lab results, and care plan. X X       How to register for ePACT Network:  1. Go to  [email or web address].  2. Click on the Sign Up Now box, which takes you to the New Member Sign Up page. You will need to provide the following information:  a. Enter your ePACT Network Access Code exactly as it appears at the top of this page. (You will not need to use this code after you’ve completed the sign-up process. If you do not sign up before the expiration date, you must request a new code.)   b. Enter your date of birth.   c. Enter your home email address.   d. Click Submit, and follow the next screen’s instructions.  3. Create a ePACT Network ID. This will be your ePACT Network  login ID and cannot be changed, so think of one that is secure and easy to remember.  4. Create a [employer] password. You can change your password at any time.  5. Enter your Password Reset Question and Answer. This can be used at a later time if you forget your password.   6. Enter your e-mail address. This allows you to receive e-mail notifications when new information is available in [employer]On.  7. Click Sign Up. You can now view your health information.    For assistance activating your [employer]On account, call [phone]

## 2017-07-11 NOTE — ED AVS SNAPSHOT
7/11/2017    Domitila Hua  [address] [place] [address]    Dear Domitila:    [facility] wants to ensure your discharge home is safe and you or your loved ones have had all of your questions answered regarding your care after you leave the hospital.    Below is a list of resources and contact information should you have any questions regarding your hospital stay, follow-up instructions, or active medical symptoms.    Questions or Concerns Regarding… Contact   Medical Questions Related to Your Discharge  (7 days a week, 8am-5pm) Contact a Nurse Care Coordinator   [phone]   Medical Questions Not Related to Your Discharge  (24 hours a day / 7 days a week)  Contact the Nurse Health Line   [phone]    Medications or Discharge Instructions Refer to your discharge packet   or contact your [facility] Primary Care Provider   [phone]   Follow-up Appointment(s) Schedule your appointment via [employer]   or contact Scheduling [phone]   Billing Review your statement via [employer]  or contact Billing [phone]   Medical Records Review your records via [employer]   or contact Medical Records [phone]     You may receive a telephone call within two days of discharge. This call is to make certain you understand your discharge instructions and have the opportunity to have any questions answered. You can also easily access your medical information, test results and upcoming appointments via the [employer] free online health management tool. You can learn more and sign up at [employer]. For assistance setting up your [employer] account, please call [phone].    Once again, we want to ensure your discharge home is safe and that you have a clear understanding of any next steps in your care. If you have any questions or concerns, please do not hesitate to contact us, we are here for you. Thank you for choosing [facility] for your healthcare needs.    Sincerely,    Your [facility] Healthcare Team

## 2021-12-22 RX ORDER — CYCLOBENZAPRINE HCL 10 MG
10 TABLET ORAL
COMMUNITY
Start: 2021-10-20 | End: 2021-12-22 | Stop reason: SDUPTHER

## 2021-12-26 RX ORDER — CYCLOBENZAPRINE HCL 10 MG
10 TABLET ORAL
Qty: 30 TABLET | Refills: 0 | Status: SHIPPED | OUTPATIENT
Start: 2021-12-26 | End: 2022-01-25

## 2022-02-22 ENCOUNTER — OFFICE VISIT (OUTPATIENT)
Dept: MEDICAL GROUP | Facility: CLINIC | Age: 62
End: 2022-02-22
Payer: COMMERCIAL

## 2022-02-22 VITALS
OXYGEN SATURATION: 95 % | SYSTOLIC BLOOD PRESSURE: 153 MMHG | HEIGHT: 66 IN | DIASTOLIC BLOOD PRESSURE: 81 MMHG | BODY MASS INDEX: 47.09 KG/M2 | HEART RATE: 95 BPM | RESPIRATION RATE: 18 BRPM | WEIGHT: 293 LBS

## 2022-02-22 DIAGNOSIS — M54.31 SCIATICA OF RIGHT SIDE: ICD-10-CM

## 2022-02-22 DIAGNOSIS — I10 PRIMARY HYPERTENSION: ICD-10-CM

## 2022-02-22 DIAGNOSIS — E11.00 TYPE 2 DIABETES MELLITUS WITH HYPEROSMOLARITY WITHOUT COMA, WITHOUT LONG-TERM CURRENT USE OF INSULIN (HCC): ICD-10-CM

## 2022-02-22 DIAGNOSIS — E66.3 OVERWEIGHT: ICD-10-CM

## 2022-02-22 PROBLEM — E11.9 TYPE 2 DIABETES MELLITUS, WITHOUT LONG-TERM CURRENT USE OF INSULIN (HCC): Status: ACTIVE | Noted: 2022-02-22

## 2022-02-22 PROCEDURE — 99215 OFFICE O/P EST HI 40 MIN: CPT | Performed by: FAMILY MEDICINE

## 2022-02-22 RX ORDER — AMLODIPINE BESYLATE 10 MG/1
10 TABLET ORAL
Qty: 90 TABLET | Refills: 3 | Status: SHIPPED | OUTPATIENT
Start: 2022-02-22 | End: 2023-02-02 | Stop reason: SDUPTHER

## 2022-02-22 RX ORDER — CYCLOBENZAPRINE HCL 10 MG
10 TABLET ORAL 3 TIMES DAILY PRN
Qty: 30 TABLET | Refills: 11 | Status: SHIPPED | OUTPATIENT
Start: 2022-02-22 | End: 2023-02-02 | Stop reason: SDUPTHER

## 2022-02-22 RX ORDER — AMLODIPINE BESYLATE 10 MG/1
10 TABLET ORAL
COMMUNITY
Start: 2021-12-17 | End: 2022-02-22 | Stop reason: SDUPTHER

## 2022-02-22 RX ORDER — BUPROPION HYDROCHLORIDE 150 MG/1
150 TABLET, EXTENDED RELEASE ORAL 2 TIMES DAILY
Qty: 180 TABLET | Refills: 3 | Status: SHIPPED | OUTPATIENT
Start: 2022-02-22 | End: 2023-02-02 | Stop reason: SDUPTHER

## 2022-02-22 RX ORDER — LISINOPRIL 20 MG/1
10 TABLET ORAL DAILY
Qty: 90 TABLET | Refills: 3 | Status: SHIPPED | OUTPATIENT
Start: 2022-02-22 | End: 2023-02-02

## 2022-02-22 RX ORDER — MELOXICAM 15 MG/1
15 TABLET ORAL DAILY
Qty: 30 TABLET | Refills: 11 | Status: SHIPPED | OUTPATIENT
Start: 2022-02-22 | End: 2023-02-02 | Stop reason: SDUPTHER

## 2022-02-22 RX ORDER — MELOXICAM 15 MG/1
TABLET ORAL
COMMUNITY
Start: 2022-02-14 | End: 2022-02-22

## 2022-02-22 ASSESSMENT — PATIENT HEALTH QUESTIONNAIRE - PHQ9: CLINICAL INTERPRETATION OF PHQ2 SCORE: 0

## 2022-02-22 NOTE — ASSESSMENT & PLAN NOTE
Is doing ok with this overall. Using Meloxicam 15 mg/d  Wonders if there is a higher dose.   Has not had imaging.  I tried ordering that today but we are unable to do it on our equipment.  I would like her to follow-up with sports medicine at some point to see if they think a hip injection might be helpful but we may well need to go directly to pain management for this.  I have asked her to follow-up in 6 weeks or so and we will review this further at that point I will send her to an outpatient facility for imaging first.    we have discussed in the past our concerns over long-term NSAID use and labs are being done.  I have refilled the meloxicam but would like to find other alternatives.  ? Cymbalta

## 2022-02-22 NOTE — ASSESSMENT & PLAN NOTE
Is now using a wheelchair a lot of the time, as it is difficult to get on a regular chair.  I am concerned about her losing some muscle tone.

## 2022-02-22 NOTE — PROGRESS NOTES
Subjective:     CC: sciatica, HTN, DM, overweight    HPI:   Domitila presents today to discuss the following issues     Problem   Sciatica of Right Side    Longstanding hx of R sciatica. Did go to PT for a while but could not tolerate, stopped last summer.      Primary Hypertension    Domitila checks at home and gets in the 130s over 80s range most the time.  We have been treating her with metoprolol 25 mg a day lisinopril 20 mg a day and amlodipine 10 mg a day.         Current Outpatient Medications Ordered in Epic   Medication Sig Dispense Refill   • metoprolol tartrate (LOPRESSOR) 25 MG Tab Take 1 Tablet by mouth every day. 90 Tablet 3   • amLODIPine (NORVASC) 10 MG Tab Take 1 Tablet by mouth every day. 90 Tablet 3   • lisinopril (PRINIVIL) 20 MG Tab Take 0.5 Tablets by mouth every day. 90 Tablet 3   • cyclobenzaprine (FLEXERIL) 10 mg Tab Take 1 Tablet by mouth 3 times a day as needed. 30 Tablet 11   • buPROPion SR (WELLBUTRIN-SR) 150 MG TABLET SR 12 HR sustained-release tablet Take 1 Tablet by mouth 2 times a day. 180 Tablet 3   • meloxicam (MOBIC) 15 MG tablet Take 1 Tablet by mouth every day. 30 Tablet 11   • aspirin (ASA) 81 MG Chew Tab chewable tablet Take 81 mg by mouth every day.     • oxycodone-acetaminophen (PERCOCET) 5-325 MG Tab Take 1-2 Tabs by mouth every four hours as needed (pain). (Patient not taking: Reported on 2/22/2022) 15 Tab 0   • ondansetron (ZOFRAN ODT) 4 MG TABLET DISPERSIBLE Take 1 Tab by mouth every 8 hours as needed. (Patient not taking: Reported on 2/22/2022) 10 Tab 0   • lisinopril (PRINIVIL, ZESTRIL) 40 MG tablet TK 1 T PO QD  11     No current [facility]-ordered facility-administered medications on file.       Health Maintenance:     ROS:  Gen: no fevers/chills, no changes in weight  Eyes: no changes in vision  ENT: no sore throat, no hearing loss, no bloody nose  Pulm: no sob, no cough  CV: no chest pain, no palpitations  GI: no nausea/vomiting, no diarrhea  : no dysuria  MSk: no  "myalgias  Skin: no rash  Neuro: no headaches, no numbness/tingling  Heme/Lymph: no easy bruising      Objective:     Exam:  /81 (BP Location: Right arm, Patient Position: Sitting, BP Cuff Size: Adult)   Pulse 95   Resp 18   Ht 1.676 m (5' 6\")   Wt (!) 159 kg (350 lb)   SpO2 95%   BMI 56.49 kg/m²  Body mass index is 56.49 kg/m².    Gen: Alert and oriented, No apparent distress.  Neck: Neck is supple without lymphadenopathy.  Lungs: Normal effort, CTA bilaterally, no wheezes, rhonchi, or rales  CV: Regular rate and rhythm. No murmurs, rubs, or gallops.  Ext: No clubbing, cyanosis, edema.          Assessment & Plan:     62 y.o. female with the following -     Problem List Items Addressed This Visit     Sciatica of right side     Is doing ok with this overall. Using Meloxicam 15 mg/d  Wonders if could use a larger dose. Has not had imaging. I will order that today.  F/u with sports medicine.          Relevant Medications    cyclobenzaprine (FLEXERIL) 10 mg Tab    buPROPion SR (WELLBUTRIN-SR) 150 MG TABLET SR 12 HR sustained-release tablet    Primary hypertension     I will refill years supply of the 3 antihypertensives.         Relevant Medications    metoprolol tartrate (LOPRESSOR) 25 MG Tab    amLODIPine (NORVASC) 10 MG Tab    lisinopril (PRINIVIL) 20 MG Tab    Other Relevant Orders    Comp Metabolic Panel    HEMOGLOBIN A1C    Lipid Profile    TSH WITH REFLEX TO FT4    MICROALBUM. UR RANDOM          I spent a total of 57 minutes with record review, exam, communication with the patient, communication with other providers, and documentation of this encounter.      No follow-ups on file.          "

## 2022-05-05 RX ORDER — METOPROLOL SUCCINATE 25 MG/1
TABLET, EXTENDED RELEASE ORAL
COMMUNITY
End: 2022-05-05 | Stop reason: SDUPTHER

## 2022-05-05 RX ORDER — LISINOPRIL 40 MG/1
40 TABLET ORAL DAILY
Qty: 30 TABLET | Refills: 11 | Status: SHIPPED | OUTPATIENT
Start: 2022-05-05 | End: 2023-02-02 | Stop reason: SDUPTHER

## 2022-05-05 RX ORDER — METOPROLOL SUCCINATE 25 MG/1
25 TABLET, EXTENDED RELEASE ORAL DAILY
Qty: 30 TABLET | Refills: 11 | Status: SHIPPED | OUTPATIENT
Start: 2022-05-05 | End: 2022-11-09 | Stop reason: SDUPTHER

## 2022-11-10 RX ORDER — METOPROLOL SUCCINATE 25 MG/1
25 TABLET, EXTENDED RELEASE ORAL DAILY
Qty: 90 TABLET | Refills: 3 | Status: SHIPPED | OUTPATIENT
Start: 2022-11-10 | End: 2023-02-02 | Stop reason: SDUPTHER

## 2022-11-10 NOTE — TELEPHONE ENCOUNTER
Received request via: Pharmacy    Was the patient seen in the last year in this department? Yes    Does the patient have an active prescription (recently filled or refills available) for medication(s) requested? No    Does the patient have correction Plus and need 100 day supply (blood pressure, diabetes and cholesterol meds only)? Patient does not have SCP

## 2022-11-13 ENCOUNTER — HOSPITAL ENCOUNTER (EMERGENCY)
Facility: MEDICAL CENTER | Age: 62
End: 2022-11-13
Attending: EMERGENCY MEDICINE
Payer: COMMERCIAL

## 2022-11-13 VITALS
WEIGHT: 293 LBS | HEIGHT: 66 IN | TEMPERATURE: 98.1 F | DIASTOLIC BLOOD PRESSURE: 98 MMHG | OXYGEN SATURATION: 96 % | BODY MASS INDEX: 47.09 KG/M2 | RESPIRATION RATE: 16 BRPM | HEART RATE: 85 BPM | SYSTOLIC BLOOD PRESSURE: 155 MMHG

## 2022-11-13 DIAGNOSIS — R04.0 EPISTAXIS: ICD-10-CM

## 2022-11-13 PROCEDURE — A9270 NON-COVERED ITEM OR SERVICE: HCPCS | Performed by: EMERGENCY MEDICINE

## 2022-11-13 PROCEDURE — 700102 HCHG RX REV CODE 250 W/ 637 OVERRIDE(OP): Performed by: EMERGENCY MEDICINE

## 2022-11-13 PROCEDURE — 99284 EMERGENCY DEPT VISIT MOD MDM: CPT

## 2022-11-13 RX ORDER — OXYMETAZOLINE HYDROCHLORIDE 0.05 G/100ML
2 SPRAY NASAL ONCE
Status: COMPLETED | OUTPATIENT
Start: 2022-11-13 | End: 2022-11-13

## 2022-11-13 RX ADMIN — OXYMETAZOLINE HCL 2 SPRAY: 0.05 SPRAY NASAL at 15:45

## 2022-11-13 NOTE — ED PROVIDER NOTES
ED Provider Note    CHIEF COMPLAINT  Chief Complaint   Patient presents with    Epistaxis     Intermittent nose bleeds for 2-3 days.  No active bleeding at present       [facility] [place]  Domitila Hua is a 62 y.o. female who presents with nosebleed.  Bleeding comes and goes, no active bleeding.  Patient denies any associated bleeding elsewhere.  She denies any associated rash.  She denies any swollen joints.  She denies any fevers or chills.  She reports bleeding was relatively brisk early today and this made her nervous so she came to the emergency department where it resolved.  Patient denies any easy bruising.  She denies any use of anticoagulants.    REVIEW OF SYSTEMS  ROS  See HPI for further details. All other systems are negative.     PAST MEDICAL HISTORY       SOCIAL HISTORY  Social History     Tobacco Use    Smoking status: Never    Smokeless tobacco: Never   Substance and Sexual Activity    Alcohol use: No     Alcohol/week: 0.0 oz    Drug use: No    Sexual activity: Not on file       SURGICAL HISTORY   has a past surgical history that includes gastroscopy with biopsy (4/16/2010).    CURRENT MEDICATIONS  Home Medications    **Home medications have not yet been reviewed for this encounter**         ALLERGIES  Allergies   Allergen Reactions    Nkda [No Known Drug Allergy]        PHYSICAL EXAM  Vitals:    11/13/22 1413   BP: (!) 167/96   Pulse: 98   Resp: 16   Temp: 36.6 °C (97.9 °F)   SpO2: 94%       Physical Exam  Constitutional:       Appearance: She is well-developed.   HENT:      Head: Normocephalic and atraumatic.      Nose:      Comments: Minimal dried left nare.  No active bleeding.  No blood in the oropharynx.  Eyes:      Conjunctiva/sclera: Conjunctivae normal.   Pulmonary:      Effort: Pulmonary effort is normal.   Musculoskeletal:      Cervical back: Normal range of motion and neck supple.   Skin:     General: Skin is warm.      Comments: No associated.  No associated swollen joints or pain on range of  motion of any major joints.   Neurological:      Mental Status: She is alert and oriented to person, place, and time.   Psychiatric:         Behavior: Behavior normal.         COURSE & MEDICAL DECISION MAKING  Pertinent Labs & Imaging studies reviewed. (See chart for details)    Patient here with prostatic cyst that has now resolved.  Given there is no ongoing bleeding I have instructed patient on home management.  She has been given a nasal clamp, I did apply some Afrin here and showed patient how to use Afrin in these cases.  Return precautions discussed.    The patient will return for worsening symptoms and is stable at the time of discharge. The patient verbalizes understanding and will comply.    FINAL IMPRESSION  1.  Epistaxis    Electronically signed by: Zak Mulligan M.D., 11/13/2022 3:19 PM

## 2022-11-13 NOTE — ED TRIAGE NOTES
Chief Complaint   Patient presents with    Epistaxis     Intermittent nose bleeds for 2-3 days.  No active bleeding at present        [] :  [Healthy] : healthy

## 2023-01-16 RX ORDER — CYCLOBENZAPRINE HCL 10 MG
10 TABLET ORAL 3 TIMES DAILY PRN
Qty: 30 TABLET | Refills: 11 | OUTPATIENT
Start: 2023-01-16

## 2023-01-31 ENCOUNTER — PATIENT MESSAGE (OUTPATIENT)
Dept: MEDICAL GROUP | Facility: CLINIC | Age: 63
End: 2023-01-31
Payer: COMMERCIAL

## 2023-02-01 ENCOUNTER — OFFICE VISIT (OUTPATIENT)
Dept: MEDICAL GROUP | Facility: CLINIC | Age: 63
End: 2023-02-01
Payer: COMMERCIAL

## 2023-02-01 VITALS
HEIGHT: 66 IN | DIASTOLIC BLOOD PRESSURE: 93 MMHG | SYSTOLIC BLOOD PRESSURE: 148 MMHG | BODY MASS INDEX: 47.09 KG/M2 | RESPIRATION RATE: 20 BRPM | OXYGEN SATURATION: 93 % | TEMPERATURE: 99 F | WEIGHT: 293 LBS | HEART RATE: 106 BPM

## 2023-02-01 DIAGNOSIS — M25.551 RIGHT HIP PAIN: ICD-10-CM

## 2023-02-01 DIAGNOSIS — E66.01 OBESITY, MORBID, BMI 50 OR HIGHER (HCC): ICD-10-CM

## 2023-02-01 DIAGNOSIS — E11.00 TYPE 2 DIABETES MELLITUS WITH HYPEROSMOLARITY WITHOUT COMA, WITHOUT LONG-TERM CURRENT USE OF INSULIN (HCC): ICD-10-CM

## 2023-02-01 DIAGNOSIS — I10 PRIMARY HYPERTENSION: ICD-10-CM

## 2023-02-01 DIAGNOSIS — Z00.00 ROUTINE ADULT HEALTH MAINTENANCE: ICD-10-CM

## 2023-02-01 DIAGNOSIS — M54.31 SCIATICA OF RIGHT SIDE: ICD-10-CM

## 2023-02-01 PROCEDURE — 99214 OFFICE O/P EST MOD 30 MIN: CPT | Mod: GC

## 2023-02-01 NOTE — ASSESSMENT & PLAN NOTE
Not currently on any antidiabetic medication  Last HbA1c not found in results but noted to be in mid sevens at previous visit, possibly early 2022.  - HbA1c ordered today

## 2023-02-01 NOTE — ASSESSMENT & PLAN NOTE
3+ year duration, pain starts in low back and radiates down lateral side of leg- suspect atypical sciatica pain versus meralgia paresthetica.  Associated with obesity, likely main cause of pain  Came on gradually, no injury  Failed PT about a year ago due to inability to move well  Patient often wheelchair due to difficulty walking and on walker when walking.  -Continue meloxicam and cyclobenzaprine  -Lumbar and hip and pelvis x-ray ordered  - [employer] disability placard filled out today  -New order for bariatric wheelchair placed as hers is not fully functional (acquired from her garage)    OMM performed today:  Counterstrain to coccygeous on right side, Sacral rock, muscle energy to rotated right L2-5  -Patient may follow-up for OMT visit if desires  -Consider PT in the future if patient is able to regain more mobility

## 2023-02-01 NOTE — ASSESSMENT & PLAN NOTE
Likely cause of patient's musculoskeletal pain  - TSH ordered to rule out thyroid dysfunction  - Encourage patient to continue healthy eating habits and moving as much as her pain will allow

## 2023-02-01 NOTE — ASSESSMENT & PLAN NOTE
Pt compliant with her medication: metoprolol ER 25mg Qday, Lisinopril 40mg Qday, Amolodipine 10mg Qday  BP in clinic today: 143/93.  Patient not checking at home  - Encourage patient to continue to be compliant with her medication  - Take blood pressure log and follow-up in 2 months.  - CMP acquired today to assess potassium on lisinopril  - Assess medication based on blood pressure log and labs at next visit.

## 2023-02-01 NOTE — PROGRESS NOTES
Subjective:     CC: right hip discomfort    HPI:   Domitila presents today with son, Tom, with right hip discomfort for 3 years. She had previously seen Dr. Wiggins for the hip pain for which an x-ray was ordered. Pain starts in the low back radiating to the anterolateral aspect of the right leg. Pt is taking Cyclobenzaprine which she states helps with the pain. Pt has gone to PT but pt states she was not in physical condition to complete her visits, she explains that there was stress and anxiety going to therapy. Pt is in a wheel chair and she uses a walker for stability and balance, She states that she has not been able to walk without assistance for 3 years. Pt explained that she feels unbalanced while standing as well as sitting. Pt states that she asked for a bariatric wheelchair but she has yet to receive it due to insurance. The wheel chair she has is in moderate condition with the right arm being damaged and the pt states that sometime the wheels do not work. Pt has seen ortho and received an injection in the left knee which did not last     Tom (son) states his mom cannot balance in parking lots and cannot walk long distances when she has to park far away from a store. Pt states that she can drive safely, without difficulty.  Pt would like a disability placard for her car today.      Pt denies any pressure wounds and states she gets up out of her wheelchair and moves around at home.      HTN:  On lisinopril, metoprolol, and amlodipine. BP reads from a couple years ago at home 133/84, 141/85.  BP in clinic today: 148/93.  Pt took all of her meds this AM and is compliant.      Social Hx:  Denies alcohol, tobacco or drug use.      Current Outpatient Medications Ordered in Epic   Medication Sig Dispense Refill    metoprolol SR (TOPROL XL) 25 MG TABLET SR 24 HR Take 1 Tablet by mouth every day. 90 Tablet 3    lisinopril (PRINIVIL) 40 MG tablet Take 1 Tablet by mouth every day. 30 Tablet 11    metoprolol  "tartrate (LOPRESSOR) 25 MG Tab Take 1 Tablet by mouth every day. 90 Tablet 3    amLODIPine (NORVASC) 10 MG Tab Take 1 Tablet by mouth every day. 90 Tablet 3    lisinopril (PRINIVIL) 20 MG Tab Take 0.5 Tablets by mouth every day. 90 Tablet 3    cyclobenzaprine (FLEXERIL) 10 mg Tab Take 1 Tablet by mouth 3 times a day as needed. 30 Tablet 11    buPROPion SR (WELLBUTRIN-SR) 150 MG TABLET SR 12 HR sustained-release tablet Take 1 Tablet by mouth 2 times a day. 180 Tablet 3    meloxicam (MOBIC) 15 MG tablet Take 1 Tablet by mouth every day. 30 Tablet 11    oxycodone-acetaminophen (PERCOCET) 5-325 MG Tab Take 1-2 Tabs by mouth every four hours as needed (pain). (Patient not taking: Reported on 2/22/2022) 15 Tab 0    ondansetron (ZOFRAN ODT) 4 MG TABLET DISPERSIBLE Take 1 Tab by mouth every 8 hours as needed. (Patient not taking: Reported on 2/22/2022) 10 Tab 0    lisinopril (PRINIVIL, ZESTRIL) 40 MG tablet TK 1 T PO QD  11    aspirin (ASA) 81 MG Chew Tab chewable tablet Take 81 mg by mouth every day.       No current [facility]-ordered facility-administered medications on file.       No past medical history on file.     Past Surgical History:   Procedure Laterality Date    GASTROSCOPY WITH BIOPSY  4/16/2010    Performed by LATHA PERALTA at [facility]        No family history on file.         Objective:     Exam:  BP (!) 148/93 (BP Location: Right arm, Patient Position: Sitting, BP Cuff Size: Large adult)   Pulse (!) 106   Temp 37.2 °C (99 °F) (Temporal)   Resp 20   Ht 1.676 m (5' 6\")   Wt (!) 161 kg (356 lb)   SpO2 93%   BMI 57.46 kg/m²  Body mass index is 57.46 kg/m².    Gen: Alert and oriented, No apparent distress. Obese.  Wound smell.    Head:  NCAT, EOMI, sclera clear without discharge  Neck: Neck is supple without lymphadenopathy.  Lungs: Normal effort, CTA bilaterally, no wheezes, rhonchi, or rales  CV: Regular rate and rhythm. No murmurs, rubs, or gallops.  Abd:   Non-distended, soft  Ext: No " clubbing, cyanosis, edema.  MSK: Needs assistance with gait, walks slow. Pain to palpation over right piriformis and coccygeus. Pain with right hip external rotation.  L2-5 rotated right.  Derm: No lesions on exposed skin  Psych: normal mood and affect        Assessment & Plan:     63 y.o. female with the following -         Problem List Items Addressed This Visit       Sciatica of right side     3+ year duration, pain starts in low back and radiates down lateral side of leg- suspect atypical sciatica pain versus meralgia paresthetica.  Associated with obesity, likely main cause of pain  Came on gradually, no injury  Failed PT about a year ago due to inability to move well  Patient often wheelchair due to difficulty walking and on walker when walking.  -Continue meloxicam and cyclobenzaprine  -Lumbar and hip and pelvis x-ray ordered  - [employer] disability placard filled out today  -New order for bariatric wheelchair placed as hers is not fully functional (acquired from her garage)    OMM performed today:  Counterstrain to coccygeous on right side, Sacral rock, muscle energy to rotated right L2-5  -Patient may follow-up for OMT visit if desires  -Consider PT in the future if patient is able to regain more mobility         Primary hypertension     Pt compliant with her medication: metoprolol ER 25mg Qday, Lisinopril 40mg Qday, Amolodipine 10mg Qday  BP in clinic today: 143/93.  Patient not checking at home  - Encourage patient to continue to be compliant with her medication  - Take blood pressure log and follow-up in 2 months.  - CMP acquired today to assess potassium on lisinopril  - Assess medication based on blood pressure log and labs at next visit.         Type 2 diabetes mellitus, without long-term current use of insulin (HCC)     Not currently on any antidiabetic medication  Last HbA1c not found in results but noted to be in mid sevens at previous visit, possibly early 2022.  - HbA1c ordered today         Obesity,  morbid, BMI 50 or higher (HCC)     Likely cause of patient's musculoskeletal pain  - TSH ordered to rule out thyroid dysfunction  - Encourage patient to continue healthy eating habits and moving as much as her pain will allow          Other Visit Diagnoses       Routine adult health maintenance        Relevant Orders    CBC WITH DIFFERENTIAL    Comp Metabolic Panel    Lipid Profile    HEMOGLOBIN A1C    TSH WITH REFLEX TO FT4    Right hip pain        Relevant Orders    DME Wheelchair    DX-HIP-BILATERAL-WITH PELVIS-2 VIEWS    DX-LUMBAR SPINE-4+ VIEWS            Follow-up: In 2 months

## 2023-02-02 RX ORDER — METOPROLOL SUCCINATE 25 MG/1
25 TABLET, EXTENDED RELEASE ORAL DAILY
Qty: 90 TABLET | Refills: 3 | Status: SHIPPED | OUTPATIENT
Start: 2023-02-02 | End: 2024-02-29 | Stop reason: SDUPTHER

## 2023-02-02 RX ORDER — MELOXICAM 15 MG/1
15 TABLET ORAL DAILY
Qty: 90 TABLET | Refills: 3 | Status: SHIPPED | OUTPATIENT
Start: 2023-02-02 | End: 2023-05-03

## 2023-02-02 RX ORDER — AMLODIPINE BESYLATE 10 MG/1
10 TABLET ORAL
Qty: 90 TABLET | Refills: 3 | Status: SHIPPED | OUTPATIENT
Start: 2023-02-02 | End: 2023-05-03

## 2023-02-02 RX ORDER — ASPIRIN 81 MG/1
81 TABLET, CHEWABLE ORAL DAILY
Qty: 90 TABLET | Refills: 3 | Status: SHIPPED | OUTPATIENT
Start: 2023-02-02 | End: 2023-05-03

## 2023-02-02 RX ORDER — CYCLOBENZAPRINE HCL 10 MG
10 TABLET ORAL 3 TIMES DAILY PRN
Qty: 90 TABLET | Refills: 3 | Status: SHIPPED | OUTPATIENT
Start: 2023-02-02 | End: 2023-05-03

## 2023-02-02 RX ORDER — BUPROPION HYDROCHLORIDE 150 MG/1
150 TABLET, EXTENDED RELEASE ORAL 2 TIMES DAILY
Qty: 180 TABLET | Refills: 3 | Status: SHIPPED | OUTPATIENT
Start: 2023-02-02 | End: 2024-02-29 | Stop reason: SDUPTHER

## 2023-02-02 RX ORDER — LISINOPRIL 40 MG/1
40 TABLET ORAL DAILY
Qty: 90 TABLET | Refills: 3 | Status: SHIPPED | OUTPATIENT
Start: 2023-02-02 | End: 2023-05-03

## 2023-02-18 ENCOUNTER — APPOINTMENT (OUTPATIENT)
Dept: RADIOLOGY | Facility: MEDICAL CENTER | Age: 63
End: 2023-02-18
Payer: COMMERCIAL

## 2023-04-05 ENCOUNTER — APPOINTMENT (OUTPATIENT)
Dept: MEDICAL GROUP | Facility: CLINIC | Age: 63
End: 2023-04-05
Payer: COMMERCIAL

## 2023-05-04 ENCOUNTER — APPOINTMENT (OUTPATIENT)
Dept: RADIOLOGY | Facility: MEDICAL CENTER | Age: 63
End: 2023-05-04
Payer: COMMERCIAL

## 2023-05-09 ENCOUNTER — HOSPITAL ENCOUNTER (OUTPATIENT)
Dept: LAB | Facility: MEDICAL CENTER | Age: 63
End: 2023-05-09
Payer: COMMERCIAL

## 2023-05-09 ENCOUNTER — HOSPITAL ENCOUNTER (OUTPATIENT)
Dept: RADIOLOGY | Facility: MEDICAL CENTER | Age: 63
End: 2023-05-09
Payer: COMMERCIAL

## 2023-05-09 DIAGNOSIS — Z00.00 ROUTINE ADULT HEALTH MAINTENANCE: ICD-10-CM

## 2023-05-09 DIAGNOSIS — M25.551 RIGHT HIP PAIN: ICD-10-CM

## 2023-05-09 LAB
ALBUMIN SERPL BCP-MCNC: 3.7 G/DL (ref 3.2–4.9)
ALBUMIN/GLOB SERPL: 0.9 G/DL
ALP SERPL-CCNC: 109 U/L (ref 30–99)
ALT SERPL-CCNC: 16 U/L (ref 2–50)
ANION GAP SERPL CALC-SCNC: 13 MMOL/L (ref 7–16)
ANISOCYTOSIS BLD QL SMEAR: ABNORMAL
AST SERPL-CCNC: 12 U/L (ref 12–45)
BASOPHILS # BLD AUTO: 0.8 % (ref 0–1.8)
BASOPHILS # BLD: 0.05 K/UL (ref 0–0.12)
BILIRUB SERPL-MCNC: 0.3 MG/DL (ref 0.1–1.5)
BUN SERPL-MCNC: 15 MG/DL (ref 8–22)
CALCIUM ALBUM COR SERPL-MCNC: 9.1 MG/DL (ref 8.5–10.5)
CALCIUM SERPL-MCNC: 8.9 MG/DL (ref 8.5–10.5)
CHLORIDE SERPL-SCNC: 100 MMOL/L (ref 96–112)
CHOLEST SERPL-MCNC: 176 MG/DL (ref 100–199)
CO2 SERPL-SCNC: 21 MMOL/L (ref 20–33)
COMMENT 1642: NORMAL
CREAT SERPL-MCNC: 0.73 MG/DL (ref 0.5–1.4)
EOSINOPHIL # BLD AUTO: 0.27 K/UL (ref 0–0.51)
EOSINOPHIL NFR BLD: 4.2 % (ref 0–6.9)
ERYTHROCYTE [DISTWIDTH] IN BLOOD BY AUTOMATED COUNT: 46.5 FL (ref 35.9–50)
EST. AVERAGE GLUCOSE BLD GHB EST-MCNC: 206 MG/DL
FASTING STATUS PATIENT QL REPORTED: NORMAL
GFR SERPLBLD CREATININE-BSD FMLA CKD-EPI: 93 ML/MIN/1.73 M 2
GLOBULIN SER CALC-MCNC: 4 G/DL (ref 1.9–3.5)
GLUCOSE SERPL-MCNC: 214 MG/DL (ref 65–99)
HBA1C MFR BLD: 8.8 % (ref 4–5.6)
HCT VFR BLD AUTO: 39.6 % (ref 37–47)
HDLC SERPL-MCNC: 41 MG/DL
HGB BLD-MCNC: 11.6 G/DL (ref 12–16)
HYPOCHROMIA BLD QL SMEAR: ABNORMAL
IMM GRANULOCYTES # BLD AUTO: 0.02 K/UL (ref 0–0.11)
IMM GRANULOCYTES NFR BLD AUTO: 0.3 % (ref 0–0.9)
LDLC SERPL CALC-MCNC: 112 MG/DL
LYMPHOCYTES # BLD AUTO: 1.38 K/UL (ref 1–4.8)
LYMPHOCYTES NFR BLD: 21.6 % (ref 22–41)
MCH RBC QN AUTO: 22.1 PG (ref 27–33)
MCHC RBC AUTO-ENTMCNC: 29.3 G/DL (ref 33.6–35)
MCV RBC AUTO: 75.4 FL (ref 81.4–97.8)
MICROCYTES BLD QL SMEAR: ABNORMAL
MONOCYTES # BLD AUTO: 0.47 K/UL (ref 0–0.85)
MONOCYTES NFR BLD AUTO: 7.4 % (ref 0–13.4)
MORPHOLOGY BLD-IMP: NORMAL
NEUTROPHILS # BLD AUTO: 4.2 K/UL (ref 2–7.15)
NEUTROPHILS NFR BLD: 65.7 % (ref 44–72)
NRBC # BLD AUTO: 0 K/UL
NRBC BLD-RTO: 0 /100 WBC
PLATELET # BLD AUTO: 285 K/UL (ref 164–446)
PLATELET BLD QL SMEAR: NORMAL
PMV BLD AUTO: 9.5 FL (ref 9–12.9)
POLYCHROMASIA BLD QL SMEAR: NORMAL
POTASSIUM SERPL-SCNC: 4.1 MMOL/L (ref 3.6–5.5)
PROT SERPL-MCNC: 7.7 G/DL (ref 6–8.2)
RBC # BLD AUTO: 5.25 M/UL (ref 4.2–5.4)
RBC BLD AUTO: PRESENT
SODIUM SERPL-SCNC: 134 MMOL/L (ref 135–145)
TRIGL SERPL-MCNC: 114 MG/DL (ref 0–149)
TSH SERPL DL<=0.005 MIU/L-ACNC: 1.97 UIU/ML (ref 0.38–5.33)
WBC # BLD AUTO: 6.4 K/UL (ref 4.8–10.8)

## 2023-05-09 PROCEDURE — 36415 COLL VENOUS BLD VENIPUNCTURE: CPT

## 2023-05-09 PROCEDURE — 83036 HEMOGLOBIN GLYCOSYLATED A1C: CPT

## 2023-05-09 PROCEDURE — 85025 COMPLETE CBC W/AUTO DIFF WBC: CPT

## 2023-05-09 PROCEDURE — 80061 LIPID PANEL: CPT

## 2023-05-09 PROCEDURE — 84443 ASSAY THYROID STIM HORMONE: CPT

## 2023-05-09 PROCEDURE — 73521 X-RAY EXAM HIPS BI 2 VIEWS: CPT

## 2023-05-09 PROCEDURE — 72110 X-RAY EXAM L-2 SPINE 4/>VWS: CPT

## 2023-05-09 PROCEDURE — 80053 COMPREHEN METABOLIC PANEL: CPT

## 2023-05-10 ENCOUNTER — OFFICE VISIT (OUTPATIENT)
Dept: MEDICAL GROUP | Facility: CLINIC | Age: 63
End: 2023-05-10
Payer: COMMERCIAL

## 2023-05-10 VITALS
DIASTOLIC BLOOD PRESSURE: 86 MMHG | BODY MASS INDEX: 39.68 KG/M2 | SYSTOLIC BLOOD PRESSURE: 134 MMHG | TEMPERATURE: 99.5 F | HEIGHT: 72 IN | OXYGEN SATURATION: 92 % | WEIGHT: 293 LBS | HEART RATE: 95 BPM

## 2023-05-10 DIAGNOSIS — M41.86 LEVOSCOLIOSIS OF LUMBAR SPINE: ICD-10-CM

## 2023-05-10 DIAGNOSIS — M54.31 SCIATICA OF RIGHT SIDE: ICD-10-CM

## 2023-05-10 DIAGNOSIS — E11.9 TYPE 2 DIABETES MELLITUS WITHOUT COMPLICATION, WITHOUT LONG-TERM CURRENT USE OF INSULIN (HCC): ICD-10-CM

## 2023-05-10 PROCEDURE — 99214 OFFICE O/P EST MOD 30 MIN: CPT | Mod: GC

## 2023-05-10 RX ORDER — ATORVASTATIN CALCIUM 20 MG/1
20 TABLET, FILM COATED ORAL NIGHTLY
Qty: 30 TABLET | Refills: 3 | Status: SHIPPED | OUTPATIENT
Start: 2023-05-10 | End: 2023-09-21 | Stop reason: SDUPTHER

## 2023-05-10 RX ORDER — MELOXICAM 15 MG/1
TABLET ORAL
COMMUNITY
Start: 2023-05-08 | End: 2023-05-10 | Stop reason: SDUPTHER

## 2023-05-10 RX ORDER — FLASH GLUCOSE SENSOR
1 KIT MISCELLANEOUS CONTINUOUS
Qty: 1 EACH | Refills: 0 | Status: SHIPPED | OUTPATIENT
Start: 2023-05-10 | End: 2023-06-28 | Stop reason: ALTCHOICE

## 2023-05-10 RX ORDER — GLUCOSAMINE HCL/CHONDROITIN SU 500-400 MG
CAPSULE ORAL
Qty: 100 EACH | Refills: 0 | Status: SHIPPED | OUTPATIENT
Start: 2023-05-10

## 2023-05-10 RX ORDER — AMLODIPINE BESYLATE 10 MG/1
10 TABLET ORAL DAILY
COMMUNITY
End: 2024-02-29 | Stop reason: SDUPTHER

## 2023-05-10 RX ORDER — LANCETS 30 GAUGE
EACH MISCELLANEOUS
Qty: 100 EACH | Refills: 0 | Status: SHIPPED | OUTPATIENT
Start: 2023-05-10

## 2023-05-10 RX ORDER — LISINOPRIL 40 MG/1
TABLET ORAL
COMMUNITY
End: 2024-02-29 | Stop reason: SDUPTHER

## 2023-05-10 RX ORDER — MELOXICAM 15 MG/1
15 TABLET ORAL DAILY
Qty: 90 TABLET | Refills: 3 | Status: SHIPPED | OUTPATIENT
Start: 2023-05-10

## 2023-05-10 RX ORDER — ORAL SEMAGLUTIDE 3 MG/1
3 TABLET ORAL ONCE
Qty: 30 TABLET | Refills: 0 | Status: SHIPPED | OUTPATIENT
Start: 2023-05-10 | End: 2023-05-10

## 2023-05-10 RX ORDER — CYCLOBENZAPRINE HCL 10 MG
10 TABLET ORAL 3 TIMES DAILY PRN
Qty: 180 TABLET | Refills: 3 | Status: SHIPPED | OUTPATIENT
Start: 2023-05-10 | End: 2024-02-29 | Stop reason: SDUPTHER

## 2023-05-10 RX ORDER — CYCLOBENZAPRINE HCL 10 MG
TABLET ORAL
COMMUNITY
Start: 2023-05-08 | End: 2023-05-10 | Stop reason: SDUPTHER

## 2023-05-10 ASSESSMENT — FIBROSIS 4 INDEX: FIB4 SCORE: 0.64

## 2023-05-11 NOTE — ASSESSMENT & PLAN NOTE
Chronic, likely exacerbated by weight  Lumbar X-ray 5/9/23 showed levoscoliosis and age related changes without fracture or malalignment.  Hip XR 5/9/23 showed moderate-severe osteophytic changes of the hips  -Refill Flexeril 20mg PRN at night - warned about tolerance - continue to monitor  -Refilled meloxicam 15mg   -Referral to PM&R  -Schedule 30 min appt for OMT as desired

## 2023-05-11 NOTE — ASSESSMENT & PLAN NOTE
Failed metformin in past, Jardiance not covered.    A1c increased to 8.8 from mid-sevens  Associated with severe obesity (BMI: 47)  ASCVD risk score: 13.4%  -Glucometer and supplies ordered  -Prior auth started for Free Style Hayde CGM - this would be preferable both medically and by pt if it could get covered.    -Rybelsus ordered and prior auth started - if covered - per dosing recommendations, 3mg Qday for 30 days then increase to 7mg Qday for maintenance.   -If Ozempic is more easily covered - will plan to switch to that.   -Start atorvastatin 20mg - increase to 40mg at high intensity at next visit if tolerated.    -Plan to recheck A1c August 2023

## 2023-06-21 ENCOUNTER — TELEPHONE (OUTPATIENT)
Dept: VASCULAR LAB | Facility: MEDICAL CENTER | Age: 63
End: 2023-06-21
Payer: COMMERCIAL

## 2023-06-21 NOTE — TELEPHONE ENCOUNTER
[facility]    Received DM referral from Dr Neumann on 6/16    Scheduled NP 7/6    Insurance: [employer]  PCP: [facility]  Locations to be seen: Any    [facility] Anticoagulation/Pharmacotherapy Clinic at [phone], fax [phone]    Kinjal Oden, PharmD

## 2023-06-28 RX ORDER — PROCHLORPERAZINE 25 MG/1
1 SUPPOSITORY RECTAL CONTINUOUS
Qty: 3 EACH | Refills: 12 | Status: SHIPPED | OUTPATIENT
Start: 2023-06-28 | End: 2024-03-05 | Stop reason: SDUPTHER

## 2023-06-30 ENCOUNTER — TELEPHONE (OUTPATIENT)
Dept: MEDICAL GROUP | Facility: CLINIC | Age: 63
End: 2023-06-30
Payer: COMMERCIAL

## 2023-06-30 NOTE — TELEPHONE ENCOUNTER
----- Message from Mary Neumann D.O. sent at 6/28/2023  2:19 PM PDT -----  Regarding: RE: Prior Auths for Joanne Hua  Thank you Alia, I have ordered the Dexcom G6 continuous glucose monitor for her at this time.  Does her insurance cover the Rybelsus or not?     Thank you,  Saray  ----- Message -----  From: Alia Amy-Ross, Med Ass't  Sent: 6/27/2023  11:16 AM PDT  To: Mary Neumann D.O.  Subject: FW: Prior Auths for Joanne Clay Dr. Neumann, we had to re-submitted the auth for Rybelsus for the Freestyle the insurance doesn't cover it.  They do how ever cover the Dexcom G6 Reciver and sensors, I will give it 24 hrs for the medication to see if it has been approved or denied.   Please let me know in the other.     Thank you     Alia   ----- Message -----  From: Mary Neumann D.O.  Sent: 5/15/2023   6:41 PM PDT  To: Alialois Reyes-Vandana, Med Ass't  Subject: Prior Auths for Joanne Clay Alia, can you please let me know what the next steps are for the prior auths we started Weds 5/10 for Joanne Hua - for Rybelsus and the FreeStyle Hayde CGM? I haven't had to do a prior auth yet.    Best,  Mary Neumann D.O.  [phone]

## 2023-06-30 NOTE — TELEPHONE ENCOUNTER
FINAL PRIOR AUTHORIZATION STATUS:    1.  Name of Medication & Dose: Rybelsus 3 mg      2. Prior Auth Status: Approved through 6/30/2023 to unkown approval      3. Action Taken: Pharmacy Notified: yes Patient Notified: yes

## 2023-06-30 NOTE — TELEPHONE ENCOUNTER
Phone Number Called: [phone] (home)       Call outcome: Did not leave a detailed message. Requested patient to call back.    Message: Approval, I have called the patient to let her know that we got it approval for her Rybelsus 3 mg

## 2023-07-06 ENCOUNTER — APPOINTMENT (OUTPATIENT)
Dept: VASCULAR LAB | Facility: MEDICAL CENTER | Age: 63
End: 2023-07-06
Payer: COMMERCIAL

## 2023-07-27 ENCOUNTER — APPOINTMENT (OUTPATIENT)
Dept: VASCULAR LAB | Facility: MEDICAL CENTER | Age: 63
End: 2023-07-27
Payer: COMMERCIAL

## 2023-07-27 ENCOUNTER — TELEPHONE (OUTPATIENT)
Dept: VASCULAR LAB | Facility: MEDICAL CENTER | Age: 63
End: 2023-07-27
Payer: COMMERCIAL

## 2023-07-27 NOTE — TELEPHONE ENCOUNTER
[facility]     Received DM referral from Dr Neumann on 6/16     Pt missed initial visit.    Called pt to reschedule appt to establish care - no answer. LVM.     Insurance: [employer]  PCP: [facility]  Locations to be seen: Any     [facility]Pharmacotherapy [facility] at [phone], fax [phone]    Nolan Díaz, KrishnaD, BCACP

## 2023-08-03 ENCOUNTER — TELEPHONE (OUTPATIENT)
Dept: VASCULAR LAB | Facility: MEDICAL CENTER | Age: 63
End: 2023-08-03
Payer: COMMERCIAL

## 2023-08-03 NOTE — TELEPHONE ENCOUNTER
[facility]     Received DM referral from Dr Neumann on 6/16     Pt missed initial visit.     Called pt to reschedule appt to establish care - no answer. LVM.    2nd attempt to r/s.     Insurance: [employer]  PCP: [facility]  Locations to be seen: Any     [facility] Anticoagulation/Pharmacotherapy Clinic at [phone], fax [phone]     Nolan Díaz, KrishnaD, BCACP

## 2023-08-10 ENCOUNTER — TELEPHONE (OUTPATIENT)
Dept: VASCULAR LAB | Facility: MEDICAL CENTER | Age: 63
End: 2023-08-10
Payer: COMMERCIAL

## 2023-08-10 NOTE — LETTER
[address] [place] [address]        Dear Domitila Hua ,    We have been unsuccessful in our attempts to contact you regarding your Diabetes Clinical Pharmacist referral.  Please contact us if you would like to schedule an appointment for help managing your blood sugar.    Please contact our clinic so we may assist you.  We are open Monday-Friday 8 am until 5 pm.  You may reach our Service at [phone].        Sincerely,    Sergio Almeida, KrishnaD, [facility]  Clinic Supervisor  [facility]  Outpatient Anticoagulation Service

## 2023-08-10 NOTE — TELEPHONE ENCOUNTER
[facility]     Received DM referral from Dr Neumann on 6/16     Pt missed initial visit.     Called pt to reschedule appt to establish care - no answer. LVM.     3rd attempt to r/s.     Insurance: [employer]  PCP: [facility]  Locations to be seen: Any     [facility] Anticoagulation/Pharmacotherapy Clinic at [phone], fax [phone]     Nolan Díaz, KrishnaD, BCACP

## 2023-08-17 ENCOUNTER — TELEPHONE (OUTPATIENT)
Dept: VASCULAR LAB | Facility: MEDICAL CENTER | Age: 63
End: 2023-08-17
Payer: COMMERCIAL

## 2023-08-17 NOTE — TELEPHONE ENCOUNTER
Patient returned call and expresses that she does not wish to schedule an appt at this time. Will defer back to referring provider.

## 2023-08-17 NOTE — TELEPHONE ENCOUNTER
[facility]     Received DM referral from Dr Neumann on 6/16     Pt missed initial visit.     Called pt to reschedule appt to establish care - no answer. LVM.     4th attempt to r/s.     Insurance: [employer]  PCP: [facility]  Locations to be seen: Any     [facility] at [phone], fax [phone]     Nolan Díaz, KrishnaD, BCACP

## 2023-09-21 RX ORDER — ATORVASTATIN CALCIUM 20 MG/1
20 TABLET, FILM COATED ORAL NIGHTLY
Qty: 90 TABLET | Refills: 3 | Status: SHIPPED | OUTPATIENT
Start: 2023-09-21 | End: 2023-09-24 | Stop reason: SDUPTHER

## 2023-09-24 RX ORDER — ATORVASTATIN CALCIUM 20 MG/1
20 TABLET, FILM COATED ORAL NIGHTLY
Qty: 90 TABLET | Refills: 3 | Status: SHIPPED | OUTPATIENT
Start: 2023-09-24 | End: 2024-02-29

## 2023-10-23 ENCOUNTER — APPOINTMENT (OUTPATIENT)
Dept: MEDICAL GROUP | Facility: CLINIC | Age: 63
End: 2023-10-23
Payer: COMMERCIAL

## 2023-10-26 NOTE — PROGRESS NOTES
Subjective:     CC: Follow up - R hip pain, med refills, labs    HPI:   Joanne presents today to follow up her right hip pain and labs    Problem   Bmi 45.0-49.9, Adult ([facility])    Weight stable, trying to make healthy choices and move if her pain permits       Sciatica of Right Side    Longstanding hx of R sciatica. Did go to PT for a while but could not tolerate.  Lumbar X-ray 5/9/23 showed levoscoliosis and age related changes without fracture or malalignment.  Hip XR 5/9/23 showed moderate-severe osteophytic changes of the hips  Pt admits to pain starting in lower back  Pt has had a steroid shot in her knee with very short lived relief, never in hip.    Sports med referral talked about in past but never pursued  Great relief from last visit with increase in Flexeril, stable on 20mg Flexeril a night 15mg meloxicam a day - warned her about tolerance to Flexeril.    Pt able to get out of wheelchair to walk for short distances  Notes OMT at last visit helped greatly, would like to continue.        Type 2 Diabetes Mellitus, Without Long-Term Current Use of Insulin ([facility])    Did not tolerate Metformin in past as it caused GI distress and diarrhea.    We tried to fill Jardiance in past, but her insurance would not cover it.    Her previous A1c is was in the mid sevens but elevated to 8.8 on recent labs 5/9/23, fasting glucose 214.   Pt states she has been eating poorly.  Not on any anti-diabetic meds, does not monitor glucose at home.    Unwilling to try metformin again, willing to try a GLP-1 if covered, would prefer oral pill.               Social Hx:  Has 2 sons (Giovany and Tom)     Current Outpatient Medications Ordered in Epic   Medication Sig Dispense Refill    lisinopril (PRINIVIL) 40 MG tablet LISINOPRIL 40 MG TABS      amLODIPine (NORVASC) 10 MG Tab Take 10 mg by mouth every day.      cyclobenzaprine (FLEXERIL) 10 mg Tab Take 1 Tablet by mouth 3 times a day as needed for Moderate Pain or Muscle Spasms. 180  Tablet 3    meloxicam (MOBIC) 15 MG tablet Take 1 Tablet by mouth every day. 90 Tablet 3    Blood Glucose Meter Kit Test blood sugar as recommended by provider. One Touch Ultra 2 blood glucose monitoring kit. 1 Kit 0    Blood Glucose Test Strips Use one One Touch Ultra 2 strip to test blood sugar twice daily before meals. 100 Strip 0    Lancets Use one One Touch Ultra 2 lancet to test blood sugar twice daily before meals. 100 Each 0    Alcohol Swabs Wipe site with prep pad prior to injection. 100 Each 0    Semaglutide (RYBELSUS) 3 MG Tab Take 3 mg by mouth Once for 1 dose. 30 Tablet 0    atorvastatin (LIPITOR) 20 MG Tab Take 1 Tablet by mouth every evening. 30 Tablet 3    Continuous Blood Gluc Sensor (FREESTYLE PEDRO SENSOR SYSTEM) Misc 1 Device continuous. 1 Each 0    buPROPion SR (WELLBUTRIN-SR) 150 MG TABLET SR 12 HR sustained-release tablet Take 1 Tablet by mouth 2 times a day. 180 Tablet 3    metoprolol SR (TOPROL XL) 25 MG TABLET SR 24 HR Take 1 Tablet by mouth every day. 90 Tablet 3    metoprolol tartrate (LOPRESSOR) 25 MG Tab Take 1 Tablet by mouth every day. 90 Tablet 3    lisinopril (PRINIVIL, ZESTRIL) 40 MG tablet TK 1 T PO QD  11     No current [facility]-ordered facility-administered medications on file.       No past medical history on file.     Past Surgical History:   Procedure Laterality Date    GASTROSCOPY WITH BIOPSY  4/16/2010    Performed by LATHA PERALTA at [facility] ORS        No family history on file.         Objective:     Exam:  /86 (BP Location: Left arm, Patient Position: Sitting, BP Cuff Size: Large adult)   Pulse 95   Temp 37.5 °C (99.5 °F) (Temporal)   Ht 1.829 m (6') Comment: per pt  Wt (!) 159 kg (350 lb) Comment: per pt  SpO2 92%   BMI 47.47 kg/m²  Body mass index is 47.47 kg/m².    Gen: Alert and oriented, No apparent distress. In wheelchair, obese.    Head:  NCAT, EOMI, sclera clear without discharge  Neck: Neck is supple without  lymphadenopathy.  Lungs: Normal effort, CTA bilaterally, no wheezes, rhonchi, or rales  CV: Regular rate and rhythm. No murmurs, rubs, or gallops.  Abd:   Non-distended, soft  Ext: No clubbing, cyanosis, edema. Lower back and R hip not tender in wheelchair - too difficult for pt to move onto exam table.   MSK: Can walk short distances with some difficulty, primarily in wheelchair  Derm: No lesions on exposed skin  Psych: normal mood and affect        Assessment & Plan:     63 y.o. female with the following -     Problem List Items Addressed This Visit       Sciatica of right side     Chronic, likely exacerbated by weight  Lumbar X-ray 5/9/23 showed levoscoliosis and age related changes without fracture or malalignment.  Hip XR 5/9/23 showed moderate-severe osteophytic changes of the hips  -Refill Flexeril 20mg PRN at night - warned about tolerance - continue to monitor  -Refilled meloxicam 15mg   -Referral to PM&R  -Schedule 30 min appt for OMT as desired         Relevant Medications    cyclobenzaprine (FLEXERIL) 10 mg Tab    Type 2 diabetes mellitus, without long-term current use of insulin (HCC)     Failed metformin in past, Jardiance not covered.    A1c increased to 8.8 from mid-sevens  Associated with severe obesity (BMI: 47)  ASCVD risk score: 13.4%  -Glucometer and supplies ordered  -Prior auth started for Free Style Hayde CGM - this would be preferable both medically and by pt if it could get covered.    -Rybelsus ordered and prior auth started - if covered - per dosing recommendations, 3mg Qday for 30 days then increase to 7mg Qday for maintenance.   -If Ozempic is more easily covered - will plan to switch to that.   -Start atorvastatin 20mg - increase to 40mg at high intensity at next visit if tolerated.    -Plan to recheck A1c August 2023           Relevant Medications    Semaglutide (RYBELSUS) 3 MG Tab    BMI 45.0-49.9, adult ([facility])     Would greatly benefit from GLP-1 both for obesity and T2DM            Relevant Medications    Semaglutide (RYBELSUS) 3 MG Tab     Other Visit Diagnoses       Levoscoliosis of lumbar spine        Relevant Orders    Referral to Pain Clinic            Follow-up: in 2-3 months, or PRN    Mary Neumann D.O.  PGY-1         Patient/Caregiver provided printed discharge information.

## 2024-02-01 PROBLEM — K76.0 FATTY LIVER: Status: ACTIVE | Noted: 2024-02-01

## 2024-02-05 ENCOUNTER — APPOINTMENT (OUTPATIENT)
Dept: MEDICAL GROUP | Facility: CLINIC | Age: 64
End: 2024-02-05
Payer: COMMERCIAL

## 2024-02-29 ENCOUNTER — OFFICE VISIT (OUTPATIENT)
Dept: MEDICAL GROUP | Facility: CLINIC | Age: 64
End: 2024-02-29
Payer: COMMERCIAL

## 2024-02-29 ENCOUNTER — APPOINTMENT (OUTPATIENT)
Dept: MEDICAL GROUP | Facility: CLINIC | Age: 64
End: 2024-02-29
Payer: COMMERCIAL

## 2024-02-29 VITALS
DIASTOLIC BLOOD PRESSURE: 82 MMHG | HEIGHT: 72 IN | WEIGHT: 293 LBS | BODY MASS INDEX: 39.68 KG/M2 | SYSTOLIC BLOOD PRESSURE: 128 MMHG | TEMPERATURE: 98.1 F | RESPIRATION RATE: 22 BRPM | OXYGEN SATURATION: 94 % | HEART RATE: 96 BPM

## 2024-02-29 DIAGNOSIS — T14.8XXA WOUND OF SKIN: ICD-10-CM

## 2024-02-29 DIAGNOSIS — B37.2 SKIN CANDIDIASIS: ICD-10-CM

## 2024-02-29 DIAGNOSIS — M54.31 SCIATICA OF RIGHT SIDE: ICD-10-CM

## 2024-02-29 DIAGNOSIS — F33.1 MODERATE EPISODE OF RECURRENT MAJOR DEPRESSIVE DISORDER (HCC): ICD-10-CM

## 2024-02-29 DIAGNOSIS — R35.0 FREQUENT URINATION: ICD-10-CM

## 2024-02-29 DIAGNOSIS — Z00.00 PREVENTATIVE HEALTH CARE: ICD-10-CM

## 2024-02-29 DIAGNOSIS — M41.86 LEVOSCOLIOSIS OF LUMBAR SPINE: ICD-10-CM

## 2024-02-29 DIAGNOSIS — E11.9 TYPE 2 DIABETES MELLITUS WITHOUT COMPLICATION, WITHOUT LONG-TERM CURRENT USE OF INSULIN (HCC): ICD-10-CM

## 2024-02-29 DIAGNOSIS — M25.551 RIGHT HIP PAIN: ICD-10-CM

## 2024-02-29 LAB
HBA1C MFR BLD: 15 % (ref ?–5.8)
POCT INT CON NEG: NEGATIVE
POCT INT CON POS: POSITIVE

## 2024-02-29 PROCEDURE — 3074F SYST BP LT 130 MM HG: CPT

## 2024-02-29 PROCEDURE — 99214 OFFICE O/P EST MOD 30 MIN: CPT | Mod: GC

## 2024-02-29 PROCEDURE — 3079F DIAST BP 80-89 MM HG: CPT

## 2024-02-29 PROCEDURE — 83036 HEMOGLOBIN GLYCOSYLATED A1C: CPT

## 2024-02-29 RX ORDER — AMLODIPINE BESYLATE 10 MG/1
10 TABLET ORAL DAILY
Qty: 30 TABLET | Refills: 4 | Status: SHIPPED | OUTPATIENT
Start: 2024-02-29

## 2024-02-29 RX ORDER — FLUCONAZOLE 200 MG/1
400 TABLET ORAL DAILY
Qty: 28 TABLET | Refills: 0 | Status: SHIPPED | OUTPATIENT
Start: 2024-02-29 | End: 2024-03-14

## 2024-02-29 RX ORDER — BUPROPION HYDROCHLORIDE 150 MG/1
150 TABLET, EXTENDED RELEASE ORAL 2 TIMES DAILY
Qty: 180 TABLET | Refills: 3 | Status: SHIPPED | OUTPATIENT
Start: 2024-02-29

## 2024-02-29 RX ORDER — LISINOPRIL 40 MG/1
40 TABLET ORAL DAILY
Qty: 90 TABLET | Refills: 2 | Status: SHIPPED | OUTPATIENT
Start: 2024-02-29

## 2024-02-29 RX ORDER — CYCLOBENZAPRINE HCL 10 MG
10 TABLET ORAL 3 TIMES DAILY PRN
Qty: 180 TABLET | Refills: 3 | Status: SHIPPED | OUTPATIENT
Start: 2024-02-29

## 2024-02-29 RX ORDER — NYSTATIN 100000 U/G
1 CREAM TOPICAL 2 TIMES DAILY
Qty: 28 APPLICATION | Refills: 0 | Status: SHIPPED | OUTPATIENT
Start: 2024-02-29 | End: 2024-03-14

## 2024-02-29 RX ORDER — METOPROLOL SUCCINATE 25 MG/1
25 TABLET, EXTENDED RELEASE ORAL DAILY
Qty: 90 TABLET | Refills: 3 | Status: SHIPPED | OUTPATIENT
Start: 2024-02-29

## 2024-02-29 RX ORDER — ATORVASTATIN CALCIUM 40 MG/1
40 TABLET, FILM COATED ORAL NIGHTLY
Qty: 30 TABLET | Refills: 5 | Status: SHIPPED | OUTPATIENT
Start: 2024-02-29

## 2024-02-29 ASSESSMENT — FIBROSIS 4 INDEX: FIB4 SCORE: 0.65

## 2024-02-29 NOTE — PROGRESS NOTES
Subjective:     CC: urinary incontinence and skin rash    HPI:   Domitila presents today for follow up after not being seen for the last 8 months d/t life stressors with her sister.        Problem   Frequent Urination    Patient states she urinates every 1-2 hours and has a decent amount of urine.  She often times cannot make it to the bathroom in time.     Skin Candidiasis    The pt reports a painful, erythematous, desquamating skin rash bilaterally under both breasts and bilaterally in the groin skin folds. The patient tried to use baby powder, desitin, and airing out the affected areas. She showers every other day to not dry out her skin and also increased her water intake in attempt to help the skin wounds. Nothing she has tried helped.     Moderate Episode of Recurrent Major Depressive Disorder (Hcc)    Patient states depression is well under control with Wellbutrin.     Preventative Health Care    Declined pap smear and mammogram screenings for Alevism reasons, for now.     Sciatica of Right Side    Longstanding hx of R sciatica. Did go to PT for a while but could not tolerate.  Lumbar X-ray 5/9/23 showed levoscoliosis and age related changes without fracture or malalignment.  Hip XR 5/9/23 showed moderate-severe osteophytic changes of the hips  Pt admits to pain starting in lower back  Pt has had a steroid shot in her knee with very short lived relief, never in hip.    Sports med referral talked about in past but never pursued  Great relief from last visit with increase in Flexeril, stable on 20mg Flexeril a night 15mg meloxicam a day - warned her about tolerance to Flexeril.    Pt able to get out of wheelchair to walk for short distances  Notes OMT at last visit helped greatly, would like to continue.        Type 2 diabetes mellitus    Did not tolerate Metformin in past as it caused GI distress and diarrhea. We tried to fill Jardiance in past, but her insurance would not cover it.    Pt reports she's noticed  she's lost weight because her pants fit looser. She's pleased with the weight loss. Has not had an A1c checked since May 2023, it was 8.8 then. She is trying to eat a healthier diet. Still taking her lisinopril, amlodipine, and atorvastatin. Never started taking Rybelsus. Reports not checking her blood sugar at home.  Did not start using the CGM.           Current Outpatient Medications Ordered in Epic   Medication Sig Dispense Refill    atorvastatin (LIPITOR) 40 MG Tab Take 1 Tablet by mouth every evening. 30 Tablet 5    insulin glargine 100 UNIT/ML SC SOPN injection Inject 20 Units under the skin every evening. 15 mL 5    fluconazole (DIFLUCAN) 200 MG Tab Take 2 Tablets by mouth every day for 14 days. 28 Tablet 0    nystatin (MYCOSTATIN) 442140 UNIT/GM Cream topical cream Apply 1 g topically 2 times a day for 14 days. Apply to areas of skin irritation. 28 Application 0    buPROPion SR (WELLBUTRIN-SR) 150 MG TABLET SR 12 HR sustained-release tablet Take 1 Tablet by mouth 2 times a day. 180 Tablet 3    cyclobenzaprine (FLEXERIL) 10 mg Tab Take 1 Tablet by mouth 3 times a day as needed for Moderate Pain or Muscle Spasms. 180 Tablet 3    lisinopril (PRINIVIL) 40 MG tablet Take 1 Tablet by mouth every day. 90 Tablet 2    amLODIPine (NORVASC) 10 MG Tab Take 1 Tablet by mouth every day. 30 Tablet 4    metoprolol SR (TOPROL XL) 25 MG TABLET SR 24 HR Take 1 Tablet by mouth every day. 90 Tablet 3    Continuous Blood Gluc Sensor (DEXCOM G6 SENSOR) Misc 1 Device continuous. 3 Each 12    meloxicam (MOBIC) 15 MG tablet Take 1 Tablet by mouth every day. 90 Tablet 3    Blood Glucose Meter Kit Test blood sugar as recommended by provider. One Touch Ultra 2 blood glucose monitoring kit. 1 Kit 0    Blood Glucose Test Strips Use one One Touch Ultra 2 strip to test blood sugar twice daily before meals. 100 Strip 0    Lancets Use one One Touch Ultra 2 lancet to test blood sugar twice daily before meals. 100 Each 0    Alcohol Swabs Wipe  site with prep pad prior to injection. 100 Each 0     No current [facility]-ordered facility-administered medications on file.       No past medical history on file.     Past Surgical History:   Procedure Laterality Date    GASTROSCOPY WITH BIOPSY  4/16/2010    Performed by LATHA PERALTA at ENDOSCOPY [facility]        No family history on file.         Objective:     Exam:  /82   Pulse 96   Temp 36.7 °C (98.1 °F) (Temporal)   Resp (!) 22   Ht 1.829 m (6')   Wt (!) 159 kg (350 lb)   SpO2 94%   BMI 47.47 kg/m²  Body mass index is 47.47 kg/m².    Gen: Alert and oriented, No apparent distress.  Obese.  Head:  NCAT, EOMI, sclera clear without discharge  Neck: Neck is supple without lymphadenopathy.  Lungs: Normal effort  Abd:   Non-distended, soft  Ext: No clubbing, cyanosis, edema.  MSK: Trigger point on R gluteal insertion points.  Low back muscle tenderness.  Derm: Erythematous desquemating patches under bilateral breasts and bilaterally in groin. White scaling  Psych: normal mood and affect    Labs:  Checked an A1c in office today- took two samples  by 10 mins and both read 15.0%.     Assessment & Plan:     64 y.o. female with the following -       Problem List Items Addressed This Visit       Sciatica of right side     OMT performed today: Soft tissue technique, sacral rock, counterstrain.         Relevant Medications    buPROPion SR (WELLBUTRIN-SR) 150 MG TABLET SR 12 HR sustained-release tablet    cyclobenzaprine (FLEXERIL) 10 mg Tab    Other Relevant Orders    DME Wheelchair    Type 2 diabetes mellitus     A1c today is significantly elevated at 15.  This is obviously due to medication noncompliance and lack of follow-up.  Weight loss and urinary incontinence likely due to elevated fasting glucose levels.  -Discussed starting an oral GLP-1 (Rybelsus), and patient was on board; instructed pt to start at 3 mg dose and will increase to 7 mg after med has been taken for 30 days.    -Emphasized the importance of monitoring blood sugar at home every night after dinner and keeping a journal.  - Referred patient to diabetes education.  - Will also increase atorvastatin dose from 20 mg to 40 mg (ASCVD score 13.4%)  - Lantus started, at 20 units nightly, weight-based would be 31 units but patient does not use scale and possibly has lost weight from reported 350 pounds today.  - Patient instructed to follow-up within the next 3 weeks.         Relevant Medications    insulin glargine 100 UNIT/ML SC SOPN injection    Other Relevant Orders    Lipid Profile    CBC WITH DIFFERENTIAL    Comp Metabolic Panel    MICROALBUMIN CREAT RATIO URINE    POCT  A1C (Completed)    Referral to Diabetic Education    Hemoglobin A1c    TSH WITH REFLEX TO FT4    BMI 45.0-49.9, adult ([facility])    Relevant Medications    insulin glargine 100 UNIT/ML SC SOPN injection    Other Relevant Orders    DME Wheelchair    Frequent urination     Likely polyuria secondary to uncontrolled diabetes  After diabetes is better controlled, if patient still having signs of urge incontinence will start fesoterodine, tolterodine or oxybutynin.         Skin candidiasis     Most consistent with a candidal infection secondary to uncontrolled diabetes.  Physical exam shows significant patchy erythematous rash under breasts and in groin.  -Ordered oral fluconazole and topical antifungal cream.  - Outpatient wound care referral if symptoms continue to progress.         Moderate episode of recurrent major depressive disorder (HCC)     - Refilled her Wellbutrin.         Relevant Medications    buPROPion SR (WELLBUTRIN-SR) 150 MG TABLET SR 12 HR sustained-release tablet    Preventative health care     Ordered annual labs including CMP, CBC, lipid panel  Continue to address mammogram, Pap smear and colonoscopy in the future.          Other Visit Diagnoses       Wound of skin        Relevant Orders    Referral to Other    Levoscoliosis of lumbar spine         Relevant Orders    DME Wheelchair    Right hip pain        Relevant Orders    DME Wheelchair            Follow-up: RTC in 3 months for diabetes management f/u and A1c check.    Mary Neumann D.O.  PGY-2

## 2024-03-01 NOTE — ASSESSMENT & PLAN NOTE
Likely polyuria secondary to uncontrolled diabetes  After diabetes is better controlled, if patient still having signs of urge incontinence will start fesoterodine, tolterodine or oxybutynin.

## 2024-03-01 NOTE — ASSESSMENT & PLAN NOTE
A1c today is significantly elevated at 15.  This is obviously due to medication noncompliance and lack of follow-up.  Weight loss and urinary incontinence likely due to elevated fasting glucose levels.  -Discussed starting an oral GLP-1 (Rybelsus), and patient was on board; instructed pt to start at 3 mg dose and will increase to 7 mg after med has been taken for 30 days.   -Emphasized the importance of monitoring blood sugar at home every night after dinner and keeping a journal.  - Referred patient to diabetes education.  - Will also increase atorvastatin dose from 20 mg to 40 mg (ASCVD score 13.4%)  - Lantus started, at 20 units nightly, weight-based would be 31 units but patient does not use scale and possibly has lost weight from reported 350 pounds today.  - Patient instructed to follow-up within the next 3 weeks.

## 2024-03-01 NOTE — ASSESSMENT & PLAN NOTE
Ordered annual labs including CMP, CBC, lipid panel  Continue to address mammogram, Pap smear and colonoscopy in the future.

## 2024-03-01 NOTE — ASSESSMENT & PLAN NOTE
Most consistent with a candidal infection secondary to uncontrolled diabetes.  Physical exam shows significant patchy erythematous rash under breasts and in groin.  -Ordered oral fluconazole and topical antifungal cream.  - Outpatient wound care referral if symptoms continue to progress.

## 2024-03-06 ENCOUNTER — DOCUMENTATION (OUTPATIENT)
Dept: MEDICAL GROUP | Facility: CLINIC | Age: 64
End: 2024-03-06
Payer: COMMERCIAL

## 2024-03-06 NOTE — PROGRESS NOTES
"Received concerning message from pt yesterday stating she has not been able to figure out her CGM or insulin pen. Called referral dept to change diabetes education status to \"emergent\", called [facility] who plans to contact pt today.    "

## 2024-03-11 ENCOUNTER — PATIENT OUTREACH (OUTPATIENT)
Dept: MEDICAL GROUP | Facility: CLINIC | Age: 64
End: 2024-03-11
Payer: COMMERCIAL

## 2024-03-11 NOTE — PROGRESS NOTES
SW Intern called Pt and left a voicemail regarding Diabetes education programs such as [facility], [facility], and [facility]. [facility]MA Intern sent a patient a message on "[employer] with the diabetes education information.

## 2024-03-21 ENCOUNTER — APPOINTMENT (OUTPATIENT)
Dept: MEDICAL GROUP | Facility: CLINIC | Age: 64
End: 2024-03-21
Payer: COMMERCIAL

## 2024-04-08 ENCOUNTER — APPOINTMENT (OUTPATIENT)
Dept: MEDICAL GROUP | Facility: CLINIC | Age: 64
End: 2024-04-08
Payer: COMMERCIAL

## 2024-04-08 VITALS — DIASTOLIC BLOOD PRESSURE: 85 MMHG | HEART RATE: 91 BPM | SYSTOLIC BLOOD PRESSURE: 134 MMHG | TEMPERATURE: 97.7 F

## 2024-04-08 DIAGNOSIS — M41.86 LEVOSCOLIOSIS OF LUMBAR SPINE: ICD-10-CM

## 2024-04-08 DIAGNOSIS — M99.05 PELVIC SOMATIC DYSFUNCTION: ICD-10-CM

## 2024-04-08 DIAGNOSIS — M54.31 SCIATICA OF RIGHT SIDE: ICD-10-CM

## 2024-04-08 DIAGNOSIS — M99.04 SACRAL REGION SOMATIC DYSFUNCTION: ICD-10-CM

## 2024-04-08 DIAGNOSIS — E66.01 OBESITY, MORBID, BMI 50 OR HIGHER (HCC): ICD-10-CM

## 2024-04-08 DIAGNOSIS — E11.9 TYPE 2 DIABETES MELLITUS WITHOUT COMPLICATION, WITHOUT LONG-TERM CURRENT USE OF INSULIN (HCC): ICD-10-CM

## 2024-04-08 DIAGNOSIS — M25.551 RIGHT HIP PAIN: ICD-10-CM

## 2024-04-08 PROCEDURE — 99213 OFFICE O/P EST LOW 20 MIN: CPT | Mod: 25,GE

## 2024-04-08 PROCEDURE — 98925 OSTEOPATH MANJ 1-2 REGIONS: CPT | Mod: GE

## 2024-04-08 NOTE — PROGRESS NOTES
Subjective:     CC: Follow-up and OMT    HPI:   Domitila presents today with her son Tom for diabetes follow-up and OMT:    Problem   Sciatica of Right Side    Patient's main pain complaint is still right-sided sciatica.  Chronic.  Did go to PT for a while but could not tolerate.  Pt admits to pain starting in lower back  Pt has had a steroid shot in her knee with very short lived relief, never in hip.    Sports med referral talked about in past but never pursued  Great relief from last visit with increase in Flexeril, stable on 20mg Flexeril a night 15mg meloxicam a day - warned her about tolerance to Flexeril.    Pt able to get out of wheelchair to walk for short distances  Notes OMT at last visit helped greatly, would like to continue.        Type 2 diabetes mellitus    Since last visit, patient has taken lifestyle changes to heart and has finally figured out how to set up her CGM and insulin pen.  She has recently compliantly been taking 20 units of insulin every night.  Her sugars initially were running in the 200s, now have come down to the 100s, her lowest value fasting glucose in the morning was 91 on the rare time that she took her insulin later than usual at night.  She has cut out soda and increased her water intake.  She would like to get out of her wheelchair and walk more.  She has found filling food such as peanut butter to stop her from eating carbs.  She has not yet met with the diabetic educator but has looked into a lot of resources and has nutritionist appointment set.  She has not yet gotten previously ordered labs done but plans to get them done soon.  Patient is taking the Rybelsus.  Her skin rash is greatly improved with the cream and diabetic control  She is no longer urinating frequently.       Current Outpatient Medications Ordered in Epic   Medication Sig Dispense Refill    Continuous Blood Gluc Transmit (DEXCOM G6 TRANSMITTER) Misc 1 Device as needed (for Dexcom G6 CGM). 1 Each 3     Continuous Blood Gluc  (DEXCOM G6 ) Device 1 Device as needed (to use with Dexcom G6 CGM). 1 Each 1    Insulin Pen Needle 32G X 6 MM Misc 1 Pen Needle every evening. 90 Each 2    Continuous Blood Gluc Sensor (DEXCOM G6 SENSOR) Misc 1 Device continuous. 3 Each 12    atorvastatin (LIPITOR) 40 MG Tab Take 1 Tablet by mouth every evening. 30 Tablet 5    insulin glargine 100 UNIT/ML SC SOPN injection Inject 20 Units under the skin every evening. 15 mL 5    buPROPion SR (WELLBUTRIN-SR) 150 MG TABLET SR 12 HR sustained-release tablet Take 1 Tablet by mouth 2 times a day. 180 Tablet 3    cyclobenzaprine (FLEXERIL) 10 mg Tab Take 1 Tablet by mouth 3 times a day as needed for Moderate Pain or Muscle Spasms. 180 Tablet 3    lisinopril (PRINIVIL) 40 MG tablet Take 1 Tablet by mouth every day. 90 Tablet 2    amLODIPine (NORVASC) 10 MG Tab Take 1 Tablet by mouth every day. 30 Tablet 4    metoprolol SR (TOPROL XL) 25 MG TABLET SR 24 HR Take 1 Tablet by mouth every day. 90 Tablet 3    meloxicam (MOBIC) 15 MG tablet Take 1 Tablet by mouth every day. 90 Tablet 3    Blood Glucose Meter Kit Test blood sugar as recommended by provider. One Touch Ultra 2 blood glucose monitoring kit. 1 Kit 0    Blood Glucose Test Strips Use one One Touch Ultra 2 strip to test blood sugar twice daily before meals. 100 Strip 0    Lancets Use one One Touch Ultra 2 lancet to test blood sugar twice daily before meals. 100 Each 0    Alcohol Swabs Wipe site with prep pad prior to injection. 100 Each 0     No current [facility]-ordered facility-administered medications on file.       No past medical history on file.     Past Surgical History:   Procedure Laterality Date    GASTROSCOPY WITH BIOPSY  4/16/2010    Performed by LATHA PERALTA at ENDOSCOPY [facility]        No family history on file.         Objective:     Exam:  /85   Pulse 91   Temp 36.5 °C (97.7 °F) (Temporal)  There is no height or weight on file to calculate  BMI.    Gen: Alert and oriented, No apparent distress.  Obese.  Head:  NCAT, EOMI, sclera clear without discharge  Neck: Neck is supple without lymphadenopathy.  Lungs: Normal effort  Abd:   Non-distended, soft  Ext: No clubbing, cyanosis, edema.  MSK: Primarily mobilizes in wheelchair but is able to walk a few steps.Sacral pain, R piriformis tenderness, R sided lower thoracic rib tenderness, R sided lower back pain  No midline spinal tenderness  Derm: No lesions on exposed skin, R wrist bruise  Psych: normal mood and affect        Assessment & Plan:     64 y.o. female with the following -       Problem List Items Addressed This Visit       Sciatica of right side     Lumbar X-ray 5/9/23 showed levoscoliosis and age related changes without fracture or malalignment.  Hip XR 5/9/23 showed moderate-severe osteophytic changes of the hips  OMT performed today: Primarily counterstrain to tender points on right piriformis, left superior sacral pole and right QL.  Sacral rock and respiratory assist for left on left forward sacral torsion.  Right anterior rotation treated with muscle energy and right hemipelvis BLT.  - Patient to follow-up for OMT as desired, usually pairs it with medical follow-up appointment  - Plan to continue to encourage patient to walk more and more steps to assist both her mobilities and diabetes.         Type 2 diabetes mellitus     Patient does seem to have made changes in meeting sugar goals.  We cannot weigh patient because she cannot get on the scale but she still appears to be lower in weight previously.  Healthy weight maintenance/loss is the goal but I did expect her to gain some weight back with starting insulin.  - Patient to continue 20 units of insulin at night as her sugars are reassuring.  Counseled on signs of low sugar, next steps to take such as consuming some sugar and adjusting insulin dose  - Encourage patient to establish and follow-up regularly with diabetic educator and  nutritionist.  - Patient recommended to take next available appointment with me which appears to be 5/20, I would prefer she be seen sooner but it would be difficult to be seen by another provider.  Patient will stay in touch with me over [employer] messaging.  - Patient will continue taking Rybelsus, note: Did not tolerate metformin in the past.          Patient also requesting a prescription for bariatric wheelchair-DME order written    Follow-up: 5/20/2024.    Mary Neumann D.O.  PGY-2

## 2024-04-09 NOTE — ASSESSMENT & PLAN NOTE
Lumbar X-ray 5/9/23 showed levoscoliosis and age related changes without fracture or malalignment.  Hip XR 5/9/23 showed moderate-severe osteophytic changes of the hips  OMT performed today: Primarily counterstrain to tender points on right piriformis, left superior sacral pole and right QL.  Sacral rock and respiratory assist for left on left forward sacral torsion.  Right anterior rotation treated with muscle energy and right hemipelvis BLT.  - Patient to follow-up for OMT as desired, usually pairs it with medical follow-up appointment  - Plan to continue to encourage patient to walk more and more steps to assist both her mobilities and diabetes.

## 2024-04-09 NOTE — ASSESSMENT & PLAN NOTE
Patient does seem to have made changes in meeting sugar goals.  We cannot weigh patient because she cannot get on the scale but she still appears to be lower in weight previously.  Healthy weight maintenance/loss is the goal but I did expect her to gain some weight back with starting insulin.  - Patient to continue 20 units of insulin at night as her sugars are reassuring.  Counseled on signs of low sugar, next steps to take such as consuming some sugar and adjusting insulin dose  - Encourage patient to establish and follow-up regularly with diabetic educator and nutritionist.  - Patient recommended to take next available appointment with me which appears to be 5/20, I would prefer she be seen sooner but it would be difficult to be seen by another provider.  Patient will stay in touch with me over [employer] messaging.  - Patient will continue taking Rybelsus, note: Did not tolerate metformin in the past.

## 2024-05-02 ENCOUNTER — APPOINTMENT (OUTPATIENT)
Dept: INTERNAL MEDICINE | Facility: OTHER | Age: 64
End: 2024-05-02
Payer: COMMERCIAL

## 2024-05-02 DIAGNOSIS — E11.9 DIABETES MELLITUS WITHOUT COMPLICATION (HCC): ICD-10-CM

## 2024-05-13 RX ORDER — ASPIRIN 81 MG/1
TABLET, CHEWABLE ORAL
COMMUNITY
End: 2024-05-13 | Stop reason: SDUPTHER

## 2024-05-13 NOTE — TELEPHONE ENCOUNTER
Received request via: Patient    Was the patient seen in the last year in this department? Yes    Does the patient have an active prescription (recently filled or refills available) for medication(s) requested? No    [facility]    Does the patient have long-term Plus and need 100 day supply (blood pressure, diabetes and cholesterol meds only)? Patient does not have SCP

## 2024-05-17 RX ORDER — MELOXICAM 15 MG/1
15 TABLET ORAL DAILY
Qty: 90 TABLET | Refills: 1 | Status: SHIPPED | OUTPATIENT
Start: 2024-05-17

## 2024-05-17 RX ORDER — ASPIRIN 81 MG/1
TABLET, CHEWABLE ORAL
Qty: 100 TABLET | Refills: 1 | Status: SHIPPED | OUTPATIENT
Start: 2024-05-17

## 2024-05-20 ENCOUNTER — APPOINTMENT (OUTPATIENT)
Dept: MEDICAL GROUP | Facility: CLINIC | Age: 64
End: 2024-05-20
Payer: COMMERCIAL

## 2024-09-12 RX ORDER — PEN NEEDLE, DIABETIC 32 GX 1/4"
NEEDLE, DISPOSABLE MISCELLANEOUS
Qty: 200 EACH | Refills: 3 | Status: SHIPPED | OUTPATIENT
Start: 2024-09-12

## 2024-09-12 NOTE — TELEPHONE ENCOUNTER
Received request via: Pharmacy    Was the patient seen in the last year in this department? Yes    Does the patient have an active prescription (recently filled or refills available) for medication(s) requested? No    Pharmacy Name: [id number] - [place], [place] - [address] [facility] AT [facility] [address]     Does the patient have retirement Plus and need 100-day supply? (This applies to ALL medications) Patient does not have SCP

## 2024-09-17 LAB — HBA1C MFR BLD: 5.8 % (ref ?–5.8)

## 2024-09-20 ENCOUNTER — OFFICE VISIT (OUTPATIENT)
Dept: MEDICAL GROUP | Facility: CLINIC | Age: 64
End: 2024-09-20
Payer: COMMERCIAL

## 2024-09-20 VITALS — BODY MASS INDEX: 33.69 KG/M2 | TEMPERATURE: 96.8 F | OXYGEN SATURATION: 94 % | HEART RATE: 104 BPM | WEIGHT: 248.4 LBS

## 2024-09-20 DIAGNOSIS — M54.31 SCIATICA OF RIGHT SIDE: ICD-10-CM

## 2024-09-20 DIAGNOSIS — I10 PRIMARY HYPERTENSION: ICD-10-CM

## 2024-09-20 DIAGNOSIS — E11.9 TYPE 2 DIABETES MELLITUS, WITHOUT LONG-TERM CURRENT USE OF INSULIN (HCC): ICD-10-CM

## 2024-09-20 DIAGNOSIS — D17.24 LIPOMA OF LEFT LOWER EXTREMITY: ICD-10-CM

## 2024-09-20 PROBLEM — B37.2 SKIN CANDIDIASIS: Status: RESOLVED | Noted: 2024-02-29 | Resolved: 2024-09-20

## 2024-09-20 PROBLEM — E66.01 OBESITY, MORBID, BMI 50 OR HIGHER (HCC): Status: RESOLVED | Noted: 2023-02-01 | Resolved: 2024-09-20

## 2024-09-20 PROCEDURE — 99214 OFFICE O/P EST MOD 30 MIN: CPT | Mod: GC

## 2024-09-20 RX ORDER — BUPROPION HYDROCHLORIDE 150 MG/1
150 TABLET, EXTENDED RELEASE ORAL 2 TIMES DAILY
Qty: 180 TABLET | Refills: 1 | Status: SHIPPED | OUTPATIENT
Start: 2024-09-20

## 2024-09-20 RX ORDER — ASPIRIN 81 MG/1
TABLET, CHEWABLE ORAL
Qty: 100 TABLET | Refills: 1 | Status: SHIPPED | OUTPATIENT
Start: 2024-09-20

## 2024-09-20 RX ORDER — MELOXICAM 15 MG/1
15 TABLET ORAL DAILY
Qty: 90 TABLET | Refills: 1 | Status: SHIPPED | OUTPATIENT
Start: 2024-09-20

## 2024-09-20 RX ORDER — LIDOCAINE 4 G/G
1 PATCH TOPICAL EVERY 24 HOURS
Qty: 30 PATCH | Refills: 1 | Status: SHIPPED | OUTPATIENT
Start: 2024-09-20

## 2024-09-20 RX ORDER — METOPROLOL SUCCINATE 25 MG/1
25 TABLET, EXTENDED RELEASE ORAL DAILY
Qty: 90 TABLET | Refills: 1 | Status: SHIPPED | OUTPATIENT
Start: 2024-09-20

## 2024-09-20 RX ORDER — AMLODIPINE BESYLATE 10 MG/1
10 TABLET ORAL DAILY
Qty: 90 TABLET | Refills: 1 | Status: SHIPPED | OUTPATIENT
Start: 2024-09-20

## 2024-09-20 RX ORDER — ATORVASTATIN CALCIUM 40 MG/1
40 TABLET, FILM COATED ORAL NIGHTLY
Qty: 90 TABLET | Refills: 1 | Status: SHIPPED | OUTPATIENT
Start: 2024-09-20

## 2024-09-20 RX ORDER — LISINOPRIL 40 MG/1
40 TABLET ORAL DAILY
Qty: 90 TABLET | Refills: 1 | Status: SHIPPED | OUTPATIENT
Start: 2024-09-20

## 2024-09-20 RX ORDER — CYCLOBENZAPRINE HCL 10 MG
10 TABLET ORAL 3 TIMES DAILY PRN
Qty: 180 TABLET | Refills: 1 | Status: SHIPPED | OUTPATIENT
Start: 2024-09-20

## 2024-09-20 ASSESSMENT — PATIENT HEALTH QUESTIONNAIRE - PHQ9
1. LITTLE INTEREST OR PLEASURE IN DOING THINGS: NOT AT ALL
2. FEELING DOWN, DEPRESSED, IRRITABLE, OR HOPELESS: NOT AT ALL
8. MOVING OR SPEAKING SO SLOWLY THAT OTHER PEOPLE COULD HAVE NOTICED. OR THE OPPOSITE, BEING SO FIGETY OR RESTLESS THAT YOU HAVE BEEN MOVING AROUND A LOT MORE THAN USUAL: NOT AT ALL
SUM OF ALL RESPONSES TO PHQ QUESTIONS 1-9: 0
3. TROUBLE FALLING OR STAYING ASLEEP OR SLEEPING TOO MUCH: NOT AT ALL
SUM OF ALL RESPONSES TO PHQ9 QUESTIONS 1 AND 2: 0
5. POOR APPETITE OR OVEREATING: NOT AT ALL
7. TROUBLE CONCENTRATING ON THINGS, SUCH AS READING THE NEWSPAPER OR WATCHING TELEVISION: NOT AT ALL
9. THOUGHTS THAT YOU WOULD BE BETTER OFF DEAD, OR OF HURTING YOURSELF: NOT AT ALL
6. FEELING BAD ABOUT YOURSELF - OR THAT YOU ARE A FAILURE OR HAVE LET YOURSELF OR YOUR FAMILY DOWN: NOT AL ALL
4. FEELING TIRED OR HAVING LITTLE ENERGY: NOT AT ALL

## 2024-09-20 ASSESSMENT — FIBROSIS 4 INDEX: FIB4 SCORE: 0.65

## 2024-09-20 NOTE — PROGRESS NOTES
Subjective:     CC: OMT and med follow up    HPI:   Joanne presents today with her son Tom for medical f/u and OMT.      Type 2 Diabetes:   Patient got labs done on 9/17 and her A1c decreased to 5.8.  A1c had significantly jumped to 15 on 2/29/2024 from 8.8 on 5/9/2023.  Patient states she has been eating much healthier, reducing refined carbs and cutting out soda.  She feels great.  She continues to take glargine 20 units at night and her sugars have been about 70 in the morning, a couple times she would dip into the low 60s and did experience symptoms of low sugar.  She continues to take Rybelsus 3 mg.  She has not weighed herself in 2 years but noticed that she has gone down 2 sizes and close.  When she stepped on the scale today, she was down to 248 pounds.  She is wearing her CGM and tracks her sugars on the monitor.    Monofilament testing with a 10 gram force: sensation intact: intact bilaterally  Visual Inspection: Feet without maceration, ulcers, fissures.  Pedal pulses: intact bilaterally    Right sided sciatic pain:   Patient had been looking forward to this appointment as OMT helps this pain.  She has been experiencing increased pain in the last month which affects her sleep.  IcyHot and roller balls help with it.    Right leg contraction:   Patient has noticed that her right leg did not straighten since her back got bad years ago and she has been less mobile.  She sleeps on the couch with her knees bent.  She has done PT in the past for different pain complaints but states it was a little too hard for her to do the exercises.  She is interested in considering restarting PT but after Christmas.    Resolved skin rash:  At previous visit last February when patient's A1c was discovered to be 15, patient had complained of skin fold rashes which was deemed to be likely candidiasis due to excessive sugar.  Patient states that these rashes have completely resolved and have required no medication.    L leg  lipoma:  When questioned about the medial lump on patient's left ankle, she states that it has been there for a long time, has been ultrasounded and was a lipoma.  She is interested in getting it removed.    HTN:  Continues to take amlodipine and metoprolol.    Preventative health care:  Patient declines mammogram and Pap smear at this time.    Social Hx:  Things are good at home with her 2 sons Tom and Giovany    Current Outpatient Medications Ordered in Epic   Medication Sig Dispense Refill    insulin glargine 100 UNIT/ML SC SOPN injection Inject 15 Units under the skin every evening. 15 mL 5    Semaglutide 3 MG Tab Take 1 Tablet by mouth every day. 90 Tablet 1    amLODIPine (NORVASC) 10 MG Tab Take 1 Tablet by mouth every day. 90 Tablet 1    aspirin (ASPIRIN 81) 81 MG Chew Tab chewable tablet ASPIRIN 81 MG ORAL TABLET 100 Tablet 1    atorvastatin (LIPITOR) 40 MG Tab Take 1 Tablet by mouth every evening. 90 Tablet 1    buPROPion SR (WELLBUTRIN-SR) 150 MG TABLET SR 12 HR sustained-release tablet Take 1 Tablet by mouth 2 times a day. 180 Tablet 1    cyclobenzaprine (FLEXERIL) 10 mg Tab Take 1 Tablet by mouth 3 times a day as needed for Moderate Pain or Muscle Spasms. 180 Tablet 1    lisinopril (PRINIVIL) 40 MG tablet Take 1 Tablet by mouth every day. 90 Tablet 1    meloxicam (MOBIC) 15 MG tablet Take 1 Tablet by mouth every day. 90 Tablet 1    metoprolol SR (TOPROL XL) 25 MG TABLET SR 24 HR Take 1 Tablet by mouth every day. 90 Tablet 1    Insulin Pen Needle (BD PEN NEEDLE MICRO U/F) 32G X 6 MM Misc USE 1 NEEDLE EVERY EVENING WITH LANTUS INJECTION 200 Each 3    Continuous Blood Gluc Transmit (DEXCOM G6 TRANSMITTER) Misc 1 Device as needed (for Dexcom G6 CGM). 1 Each 3    Continuous Blood Gluc  (DEXCOM G6 ) Device 1 Device as needed (to use with Dexcom G6 CGM). 1 Each 1    Continuous Blood Gluc Sensor (DEXCOM G6 SENSOR) Misc 1 Device continuous. 3 Each 12    Blood Glucose Meter Kit Test blood sugar  as recommended by provider. One Touch Ultra 2 blood glucose monitoring kit. 1 Kit 0    Blood Glucose Test Strips Use one One Touch Ultra 2 strip to test blood sugar twice daily before meals. 100 Strip 0    Lancets Use one One Touch Ultra 2 lancet to test blood sugar twice daily before meals. 100 Each 0    Alcohol Swabs Wipe site with prep pad prior to injection. 100 Each 0     No current Epic-ordered facility-administered medications on file.       History reviewed. No pertinent past medical history.     Past Surgical History:   Procedure Laterality Date    GASTROSCOPY WITH BIOPSY  4/16/2010    Performed by LATHA PERALTA at ENDOSCOPY [facility]S        History reviewed. No pertinent family history.         Objective:     Exam:  Pulse (!) 104   Temp 36 °C (96.8 °F) (Temporal)   Wt 113 kg (248 lb 6.4 oz)  SpO2 94%   BMI 33.69 kg/m²  Body mass index is 33.69 kg/m².      Gen: Alert and oriented, No apparent distress.  Obese, in wheelchair but able to stand and move with difficulty.  Head:  NCAT, EOMI, sclera clear without discharge  Neck: Neck is supple without lymphadenopathy.  Lungs: Normal effort  Abd:   Non-distended, soft  Ext: No clubbing, cyanosis, edema.  MSK: Has difficulty ambulating unassisted.  Right piriformis tender point, right anterior pelvic rotation.  Derm: No lesions on exposed skin.  No rashes noted in skin folds.  Psych: normal mood and affect        Assessment & Plan:     64 y.o. female with the following -     T2DM:  Notably labile based on recent elevated then decreased A1c  -Reduced glargine to 15 units nightly, instructed patient to reduce further to 10 units if she experiences low glucose symptoms and/or glucoses in the 60s in the morning.  - Continue Rybelsus 3 mg, may consider increasing after patient is weaned off insulin.  - Continue improved lifestyle and exercise, these seem to be notably affecting patient's diabetes.  - Due for repeat A1c December 17.    Note: pt lost  100lbs in the last year, prior weight measurements of 350lbs were reported rather than weighed in clinic.      Right-sided sciatic pain:  OMT performed today: Counterstrain for piriformis tender point with improvement in tenderness.  Muscle energy for right anterior rotation with improvement in asymmetry.  - Recommended adding Salonpas and/or lidocaine patches for pain relief.  - Continue meloxicam and Flexeril for pain relief.  - Plan to follow-up in 6 weeks for further OMT    Right leg contraction:  Mild OMT performed today-MFR and knee wobble.  Contraction is approximately 30 degrees, did not have a goniometer handy.  - Instructed patient to sleep with leg as straight as uncomfortable and encourage less use of wheelchair.  - Plan to address PT after this [place]    Left leg lipoma:  Slight tissue discoloration warranting surgical referral both for this finding and patient's preference of removal.  - General Surgery referral.  -ER precautions for increasing skin discoloration representing signs of necrosis or vascular constriction.    Preventative health care:  Continue to encourage patient to get Pap smear and mammogram.      Problem List Items Addressed This Visit       Sciatica of right side    Relevant Medications    buPROPion SR (WELLBUTRIN-SR) 150 MG TABLET SR 12 HR sustained-release tablet    cyclobenzaprine (FLEXERIL) 10 mg Tab    Primary hypertension    Relevant Medications    amLODIPine (NORVASC) 10 MG Tab    atorvastatin (LIPITOR) 40 MG Tab    lisinopril (PRINIVIL) 40 MG tablet    metoprolol SR (TOPROL XL) 25 MG TABLET SR 24 HR    Type 2 diabetes mellitus    Relevant Medications    insulin glargine 100 UNIT/ML SC SOPN injection    Semaglutide 3 MG Tab    Other Relevant Orders    Diabetic Monofilament LE Exam (Completed)    Lipoma of left lower extremity    Relevant Orders    Referral to General Surgery       Follow-up: 6 weeks for OMT, 3 months for OMT and medical follow-up (A1c)    Mary Neumann  PRIETO  PGY-3

## 2024-12-11 NOTE — TELEPHONE ENCOUNTER
Received request via: Pharmacy    Was the patient seen in the last year in this department? Yes    Does the patient have an active prescription (recently filled or refills available) for medication(s) requested? No    Pharmacy Name:   [id number] - [place], [place] - [address] [facility] AT [facility] [address]       Does the patient have detention Plus and need 100-day supply? (This applies to ALL medications) Patient does not have SCP

## 2024-12-15 RX ORDER — PROCHLORPERAZINE 25 MG/1
SUPPOSITORY RECTAL
Qty: 1 EACH | Refills: 11 | Status: SHIPPED | OUTPATIENT
Start: 2024-12-15 | End: 2024-12-15 | Stop reason: SDUPTHER

## 2024-12-17 NOTE — TELEPHONE ENCOUNTER
Received request via: Pharmacy    Was the patient seen in the last year in this department? Yes    Does the patient have an active prescription (recently filled or refills available) for medication(s) requested? No    Pharmacy Name: [facility]    Does the patient have half-way Plus and need 100-day supply? (This applies to ALL medications) Patient does not have SCP

## 2024-12-18 RX ORDER — PROCHLORPERAZINE 25 MG/1
1 SUPPOSITORY RECTAL
Qty: 1 EACH | Refills: 11 | Status: SHIPPED | OUTPATIENT
Start: 2024-12-18

## 2025-05-23 NOTE — TELEPHONE ENCOUNTER
Received request via: Patient    Was the patient seen in the last year in this department? Yes    Does the patient have an active prescription (recently filled or refills available) for medication(s) requested? No    Pharmacy Name: [id number] - [place], [place] - [address] [facility] AT [facility] [address]     Does the patient have snf Plus and need 100-day supply? (This applies to ALL medications) Patient does not have SCP

## 2025-05-25 RX ORDER — CYCLOBENZAPRINE HCL 10 MG
10 TABLET ORAL 3 TIMES DAILY PRN
Qty: 180 TABLET | Refills: 1 | Status: SHIPPED | OUTPATIENT
Start: 2025-05-25

## 2025-06-11 NOTE — TELEPHONE ENCOUNTER
Received request via: Patient    Was the patient seen in the last year in this department? Yes    Does the patient have an active prescription (recently filled or refills available) for medication(s) requested? No    Pharmacy Name: [facility][id number] - [place], [place] - [address] [facility] AT [facility] [address]     Does the patient have senior living Plus and need 100-day supply? (This applies to ALL medications) Patient does not have SCP

## 2025-06-14 RX ORDER — BUPROPION HYDROCHLORIDE 150 MG/1
150 TABLET, EXTENDED RELEASE ORAL 2 TIMES DAILY
Qty: 180 TABLET | Refills: 1 | Status: SHIPPED | OUTPATIENT
Start: 2025-06-14 | End: 2025-06-16 | Stop reason: SDUPTHER

## 2025-06-14 RX ORDER — ATORVASTATIN CALCIUM 40 MG/1
40 TABLET, FILM COATED ORAL NIGHTLY
Qty: 90 TABLET | Refills: 1 | Status: SHIPPED | OUTPATIENT
Start: 2025-06-14 | End: 2025-06-16 | Stop reason: SDUPTHER

## 2025-06-14 RX ORDER — LISINOPRIL 40 MG/1
40 TABLET ORAL DAILY
Qty: 90 TABLET | Refills: 1 | Status: SHIPPED | OUTPATIENT
Start: 2025-06-14 | End: 2025-06-16 | Stop reason: SDUPTHER

## 2025-06-16 NOTE — TELEPHONE ENCOUNTER
Received request via: Patient    Was the patient seen in the last year in this department? Yes    Does the patient have an active prescription (recently filled or refills available) for medication(s) requested? No    Pharmacy Name: [facility] #[id number] - [place], [place] - [address] [facility] AT [facility] [address]     Does the patient have penitentiary Plus and need 100-day supply? (This applies to ALL medications) Patient does not have SCP

## 2025-06-22 RX ORDER — BUPROPION HYDROCHLORIDE 150 MG/1
150 TABLET, EXTENDED RELEASE ORAL 2 TIMES DAILY
Qty: 180 TABLET | Refills: 0 | Status: SHIPPED | OUTPATIENT
Start: 2025-06-22

## 2025-06-22 RX ORDER — LISINOPRIL 40 MG/1
40 TABLET ORAL DAILY
Qty: 90 TABLET | Refills: 0 | Status: SHIPPED | OUTPATIENT
Start: 2025-06-22

## 2025-06-22 RX ORDER — ATORVASTATIN CALCIUM 40 MG/1
40 TABLET, FILM COATED ORAL NIGHTLY
Qty: 90 TABLET | Refills: 0 | Status: SHIPPED | OUTPATIENT
Start: 2025-06-22

## 2025-07-14 ENCOUNTER — TELEPHONE (OUTPATIENT)
Dept: MEDICAL GROUP | Facility: CLINIC | Age: 65
End: 2025-07-14
Payer: COMMERCIAL

## 2025-07-14 NOTE — TELEPHONE ENCOUNTER
Hello, I see you are the one taking over for Dr barraza. Patients Dexcom sensor was denied, PA denial is scanned under media for you to view.

## 2025-07-24 ENCOUNTER — APPOINTMENT (OUTPATIENT)
Dept: MEDICAL GROUP | Facility: CLINIC | Age: 65
End: 2025-07-24
Payer: COMMERCIAL

## 2025-08-15 ENCOUNTER — TELEPHONE (OUTPATIENT)
Dept: MEDICAL GROUP | Facility: CLINIC | Age: 65
End: 2025-08-15
Payer: COMMERCIAL

## 2025-08-17 RX ORDER — AVOBENZONE, HOMOSALATE, OCTISALATE, OCTOCRYLENE 30; 40; 45; 26 MG/ML; MG/ML; MG/ML; MG/ML
CREAM TOPICAL
Qty: 100 EACH | Refills: 0 | Status: SHIPPED | OUTPATIENT
Start: 2025-08-17

## 2025-08-17 RX ORDER — AVOBENZONE, HOMOSALATE, OCTISALATE, OCTOCRYLENE 30; 40; 45; 26 MG/ML; MG/ML; MG/ML; MG/ML
CREAM TOPICAL
Qty: 100 EACH | Refills: 1 | Status: SHIPPED | OUTPATIENT
Start: 2025-08-17

## 2025-09-25 ENCOUNTER — APPOINTMENT (OUTPATIENT)
Dept: MEDICAL GROUP | Facility: CLINIC | Age: 65
End: 2025-09-25
Payer: COMMERCIAL